# Patient Record
Sex: FEMALE | Race: BLACK OR AFRICAN AMERICAN | Employment: FULL TIME | URBAN - METROPOLITAN AREA
[De-identification: names, ages, dates, MRNs, and addresses within clinical notes are randomized per-mention and may not be internally consistent; named-entity substitution may affect disease eponyms.]

---

## 2018-01-09 ENCOUNTER — PROCEDURE VISIT (OUTPATIENT)
Dept: OBGYN CLINIC | Age: 28
End: 2018-01-09
Payer: COMMERCIAL

## 2018-01-09 VITALS
TEMPERATURE: 98.2 F | RESPIRATION RATE: 14 BRPM | WEIGHT: 245 LBS | DIASTOLIC BLOOD PRESSURE: 81 MMHG | HEART RATE: 86 BPM | SYSTOLIC BLOOD PRESSURE: 134 MMHG | OXYGEN SATURATION: 98 % | HEIGHT: 66 IN | BODY MASS INDEX: 39.37 KG/M2

## 2018-01-09 DIAGNOSIS — F17.200 SMOKER UNMOTIVATED TO QUIT: ICD-10-CM

## 2018-01-09 DIAGNOSIS — Z30.015 ENCOUNTER FOR INITIAL PRESCRIPTION OF VAGINAL RING HORMONAL CONTRACEPTIVE: ICD-10-CM

## 2018-01-09 DIAGNOSIS — Z30.46 ENCOUNTER FOR NEXPLANON REMOVAL: Primary | ICD-10-CM

## 2018-01-09 PROCEDURE — 11982 REMOVE DRUG IMPLANT DEVICE: CPT | Performed by: NURSE PRACTITIONER

## 2018-01-09 RX ORDER — ETONOGESTREL AND ETHINYL ESTRADIOL 11.7; 2.7 MG/1; MG/1
1 INSERT, EXTENDED RELEASE VAGINAL
Qty: 1 EACH | Refills: 4 | Status: SHIPPED | OUTPATIENT
Start: 2018-01-09 | End: 2020-06-08

## 2018-01-09 RX ORDER — ETONOGESTREL AND ETHINYL ESTRADIOL 11.7; 2.7 MG/1; MG/1
1 INSERT, EXTENDED RELEASE VAGINAL
Qty: 2 EACH | Refills: 0 | COMMUNITY
Start: 2018-01-09 | End: 2020-06-08

## 2018-01-16 ENCOUNTER — OFFICE VISIT (OUTPATIENT)
Dept: OBGYN CLINIC | Age: 28
End: 2018-01-16

## 2018-01-16 VITALS
HEART RATE: 72 BPM | BODY MASS INDEX: 38.45 KG/M2 | DIASTOLIC BLOOD PRESSURE: 70 MMHG | HEIGHT: 67 IN | RESPIRATION RATE: 14 BRPM | OXYGEN SATURATION: 98 % | WEIGHT: 245 LBS | SYSTOLIC BLOOD PRESSURE: 124 MMHG

## 2018-01-16 DIAGNOSIS — Z51.89 VISIT FOR WOUND CHECK: Primary | ICD-10-CM

## 2018-01-16 PROCEDURE — 99024 POSTOP FOLLOW-UP VISIT: CPT | Performed by: NURSE PRACTITIONER

## 2020-06-08 ENCOUNTER — INITIAL PRENATAL (OUTPATIENT)
Dept: OBGYN CLINIC | Age: 30
End: 2020-06-08
Payer: COMMERCIAL

## 2020-06-08 ENCOUNTER — HOSPITAL ENCOUNTER (OUTPATIENT)
Age: 30
Setting detail: SPECIMEN
Discharge: HOME | End: 2020-06-08
Payer: COMMERCIAL

## 2020-06-08 VITALS
WEIGHT: 248 LBS | BODY MASS INDEX: 39.43 KG/M2 | DIASTOLIC BLOOD PRESSURE: 78 MMHG | SYSTOLIC BLOOD PRESSURE: 118 MMHG | HEART RATE: 90 BPM | TEMPERATURE: 97.7 F

## 2020-06-08 PROBLEM — Z86.19 HX OF HERPES SIMPLEX TYPE 2 INFECTION: Status: ACTIVE | Noted: 2020-06-08

## 2020-06-08 PROBLEM — R89.9 ABNORMAL LABORATORY TEST RESULT: Status: ACTIVE | Noted: 2020-06-08

## 2020-06-08 PROBLEM — O03.9 SAB (SPONTANEOUS ABORTION): Status: ACTIVE | Noted: 2020-06-08

## 2020-06-08 PROBLEM — Z86.59 HISTORY OF ANXIETY: Status: ACTIVE | Noted: 2020-06-08

## 2020-06-08 PROBLEM — Z83.3 FAMILY HISTORY OF DIABETES MELLITUS: Status: ACTIVE | Noted: 2020-06-08

## 2020-06-08 LAB
CRL: NORMAL
SAC DIAMETER: NORMAL

## 2020-06-08 PROCEDURE — 87070 CULTURE OTHR SPECIMN AEROBIC: CPT

## 2020-06-08 PROCEDURE — 87491 CHLMYD TRACH DNA AMP PROBE: CPT

## 2020-06-08 PROCEDURE — 0500F INITIAL PRENATAL CARE VISIT: CPT | Performed by: NURSE PRACTITIONER

## 2020-06-08 PROCEDURE — G0123 SCREEN CERV/VAG THIN LAYER: HCPCS

## 2020-06-08 PROCEDURE — 87591 N.GONORRHOEAE DNA AMP PROB: CPT

## 2020-06-08 PROCEDURE — 76801 OB US < 14 WKS SINGLE FETUS: CPT | Performed by: OBSTETRICS & GYNECOLOGY

## 2020-06-08 RX ORDER — VALACYCLOVIR HYDROCHLORIDE 500 MG/1
500 TABLET, FILM COATED ORAL 2 TIMES DAILY
Qty: 60 TABLET | Refills: 1 | Status: SHIPPED | OUTPATIENT
Start: 2020-06-08 | End: 2020-07-08

## 2020-06-08 NOTE — PROGRESS NOTES
2. 10 weeks gestation of pregnancy  Christiaon Armando MD, Maternal Fetal Medicine, [place]   3. Marcia Taylor MD, Maternal Fetal Medicine, [place]   4. SAB (spontaneous ) X1 2019     5. family hx of polydactyly     6. Family history of diabetes mellitus     7. History of anxiety/ depression     8. Hx of herpes simplex type 2              Patient Active Problem List    Diagnosis Date Noted    SAB (spontaneous ) X1 2019 2020     Priority: High    family hx of polydactyly 2020     Priority: High     Patient's father and 5 siblings have extra digit on hands and feet.  Family history of diabetes mellitus 2020     Priority: High     2020 early one hour GTT ordered- patient's sister is diabetic      History of anxiety/ depression 2020     Priority: High     2020 stopped taking Prozac in January prior to becoming pregnant.  Hx of herpes simplex type 2  2020     Priority: High     2020 if no outbreaks, to start on Valtrex at 36 weeks                      Plan:      Initial labs drawn. Prenatal vitamins. RTO in 4 weeks for OB visit. Lab slips given. Unable to find FHT's  Will complete early ultrasound in office today for fetal viability. To schedule first trimester screen, MFM consult. Problem list reviewed and updated. NT Screen/Quad Testing and MSAFP Single Marker: requested  Role of ultrasound in pregnancy discussed; fetal survey: requests  Amniocentesis discussed: Declined  NIPT Testing not indicated  Cultures & Wet Prep Collected  Follow-up in 4 weeks  Repeat Annual every 1 year  Cervical Cytology Evaluation begins at 24years old. If Negative Cytology, Follow-up screening per current guidelines. MFM appointment scheduled      COUNSELING COMPLETED:  INITIAL OBSTETRICAL VISIT EVALUATION:  The patient was seen full history and physical was completed/reviewed.  Cytology was collected for patients over 21 years of and non-invasive alternative testing was reviewed. The literature regarding a questionable link to pitocin augmentation and induction of labor, the assistance of labor contractions and the initiation of contractions to help delivery, have been reviewed with the patient regarding the increased potential of having a  with Attention Deficit Hyperactivity Disorder and or Autism. These two disorders and the ramifications of their impact on a child and the family caring for that child has been reviewed with the patient in detail. She was given the risks, benefits and alternatives of the use of this medication. She has agreed to its use in the delivery of her unborn child if needed at the time of delivery, Yes. The patient was questioned in detail regarding any genetic misnomer history, chromosomal abnormalities, or learning disabilities in  herself, the father of the baby or their families. SHE DENIED ANY HISTORY AS STATED ABOVE: Yes    Upon completion of the visit all questions were answered and the patients follow-up and testing schedule were reviewed. Prenatal vitamins were given. T-dap Vaccine recommendations reviewed with the patient. Patient notified of timing of vaccination 27-36 weeks gestation. Patient aware Vaccine is NOT Live. Yes.

## 2020-06-09 LAB
C TRACH DNA GENITAL QL NAA+PROBE: NEGATIVE
N. GONORRHOEAE DNA: NEGATIVE
SPECIMEN DESCRIPTION: NORMAL

## 2020-06-10 ENCOUNTER — TELEPHONE (OUTPATIENT)
Dept: OBGYN CLINIC | Age: 30
End: 2020-06-10

## 2020-06-11 LAB
CULTURE: NORMAL
CYTOLOGY REPORT: NORMAL
Lab: NORMAL
SPECIMEN DESCRIPTION: NORMAL

## 2020-06-23 ENCOUNTER — ROUTINE PRENATAL (OUTPATIENT)
Dept: PERINATAL CARE | Age: 30
End: 2020-06-23
Payer: COMMERCIAL

## 2020-06-23 VITALS
HEIGHT: 66 IN | SYSTOLIC BLOOD PRESSURE: 115 MMHG | TEMPERATURE: 97.2 F | DIASTOLIC BLOOD PRESSURE: 71 MMHG | HEART RATE: 77 BPM | RESPIRATION RATE: 16 BRPM | BODY MASS INDEX: 39.29 KG/M2 | WEIGHT: 244.49 LBS

## 2020-06-23 PROCEDURE — 76801 OB US < 14 WKS SINGLE FETUS: CPT | Performed by: OBSTETRICS & GYNECOLOGY

## 2020-06-23 PROCEDURE — 76813 OB US NUCHAL MEAS 1 GEST: CPT | Performed by: OBSTETRICS & GYNECOLOGY

## 2020-06-24 ENCOUNTER — TELEPHONE (OUTPATIENT)
Dept: OBGYN CLINIC | Age: 30
End: 2020-06-24

## 2020-06-24 RX ORDER — ASPIRIN 81 MG/1
81 TABLET ORAL DAILY
Qty: 30 TABLET | Refills: 3 | Status: SHIPPED | OUTPATIENT
Start: 2020-06-24

## 2020-06-29 ENCOUNTER — HOSPITAL ENCOUNTER (OUTPATIENT)
Age: 30
Discharge: HOME | End: 2020-06-29
Payer: COMMERCIAL

## 2020-07-01 ENCOUNTER — HOSPITAL ENCOUNTER (OUTPATIENT)
Age: 30
Discharge: HOME | End: 2020-07-01
Payer: COMMERCIAL

## 2020-07-01 LAB
ABO/RH: NORMAL
ABSOLUTE EOS #: 0.08 K/UL (ref 0–0.44)
ABSOLUTE IMMATURE GRANULOCYTE: 0.03 K/UL (ref 0–0.3)
ABSOLUTE LYMPH #: 2.1 K/UL (ref 1.1–3.7)
ABSOLUTE MONO #: 0.68 K/UL (ref 0.1–1.2)
ANTIBODY SCREEN: NEGATIVE
BASOPHILS # BLD: 0 % (ref 0–2)
BASOPHILS ABSOLUTE: 0.03 K/UL (ref 0–0.2)
BILIRUBIN URINE: NEGATIVE
COLOR: YELLOW
COMMENT UA: NORMAL
DIFFERENTIAL TYPE: NORMAL
EOSINOPHILS RELATIVE PERCENT: 1 % (ref 1–4)
GLUCOSE BLD-MCNC: 86 MG/DL (ref 70–99)
GLUCOSE URINE: NEGATIVE
HCG QUANTITATIVE: ABNORMAL IU/L
HCT VFR BLD CALC: 37.3 % (ref 36.3–47.1)
HEMOGLOBIN: 12.2 G/DL (ref 11.9–15.1)
HEPATITIS B SURFACE ANTIGEN: NONREACTIVE
HIV AG/AB: NONREACTIVE
IMMATURE GRANULOCYTES: 0 %
KETONES, URINE: NEGATIVE
LEUKOCYTE ESTERASE, URINE: NEGATIVE
LYMPHOCYTES # BLD: 27 % (ref 24–43)
MCH RBC QN AUTO: 28.8 PG (ref 25.2–33.5)
MCHC RBC AUTO-ENTMCNC: 32.7 G/DL (ref 28.4–34.8)
MCV RBC AUTO: 88 FL (ref 82.6–102.9)
MONOCYTES # BLD: 9 % (ref 3–12)
NITRITE, URINE: NEGATIVE
NRBC AUTOMATED: 0 PER 100 WBC
PDW BLD-RTO: 13.7 % (ref 11.8–14.4)
PH UA: 6.5 (ref 5–8)
PLATELET # BLD: 272 K/UL (ref 138–453)
PLATELET ESTIMATE: NORMAL
PMV BLD AUTO: 10.8 FL (ref 8.1–13.5)
PROTEIN UA: NEGATIVE
RBC # BLD: 4.24 M/UL (ref 3.95–5.11)
RBC # BLD: NORMAL 10*6/UL
RUBV IGG SER QL: 451.9 IU/ML
SEG NEUTROPHILS: 63 % (ref 36–65)
SEGMENTED NEUTROPHILS ABSOLUTE COUNT: 5.01 K/UL (ref 1.5–8.1)
SICKLE CELL SCREEN: NEGATIVE
SPECIFIC GRAVITY UA: 1.02 (ref 1–1.03)
T. PALLIDUM, IGG: NONREACTIVE
TSH SERPL DL<=0.05 MIU/L-ACNC: 0.6 MIU/L (ref 0.3–5)
TURBIDITY: CLEAR
URINE HGB: NEGATIVE
UROBILINOGEN, URINE: NORMAL
WBC # BLD: 7.9 K/UL (ref 3.5–11.3)
WBC # BLD: NORMAL 10*3/UL

## 2020-07-01 PROCEDURE — 86762 RUBELLA ANTIBODY: CPT

## 2020-07-01 PROCEDURE — 84443 ASSAY THYROID STIM HORMONE: CPT

## 2020-07-01 PROCEDURE — 85660 RBC SICKLE CELL TEST: CPT

## 2020-07-01 PROCEDURE — 81220 CFTR GENE COM VARIANTS: CPT

## 2020-07-01 PROCEDURE — 87389 HIV-1 AG W/HIV-1&-2 AB AG IA: CPT

## 2020-07-01 PROCEDURE — 84702 CHORIONIC GONADOTROPIN TEST: CPT

## 2020-07-01 PROCEDURE — 36415 COLL VENOUS BLD VENIPUNCTURE: CPT

## 2020-07-01 PROCEDURE — 86780 TREPONEMA PALLIDUM: CPT

## 2020-07-01 PROCEDURE — 86901 BLOOD TYPING SEROLOGIC RH(D): CPT

## 2020-07-01 PROCEDURE — 87340 HEPATITIS B SURFACE AG IA: CPT

## 2020-07-01 PROCEDURE — 81003 URINALYSIS AUTO W/O SCOPE: CPT

## 2020-07-01 PROCEDURE — 86850 RBC ANTIBODY SCREEN: CPT

## 2020-07-01 PROCEDURE — 86900 BLOOD TYPING SEROLOGIC ABO: CPT

## 2020-07-01 PROCEDURE — 82947 ASSAY GLUCOSE BLOOD QUANT: CPT

## 2020-07-01 PROCEDURE — 85025 COMPLETE CBC W/AUTO DIFF WBC: CPT

## 2020-07-04 ENCOUNTER — HOSPITAL ENCOUNTER (OUTPATIENT)
Age: 30
Discharge: HOME | End: 2020-07-04
Payer: COMMERCIAL

## 2020-07-04 LAB
GLUCOSE ADMINISTRATION: NORMAL
GLUCOSE TOLERANCE SCREEN 50G: 130 MG/DL (ref 70–135)

## 2020-07-04 PROCEDURE — 82950 GLUCOSE TEST: CPT

## 2020-07-06 ENCOUNTER — TELEPHONE (OUTPATIENT)
Dept: OBGYN CLINIC | Age: 30
End: 2020-07-06

## 2020-07-06 PROBLEM — R73.09 ABNORMAL GLUCOSE TOLERANCE TEST (GTT): Status: ACTIVE | Noted: 2020-07-06

## 2020-07-06 NOTE — TELEPHONE ENCOUNTER
Attempted to notify patient of results and plan of care. Patient did not answer, voicemail left, awaiting call back.

## 2020-07-07 ENCOUNTER — TELEPHONE (OUTPATIENT)
Dept: OBGYN CLINIC | Age: 30
End: 2020-07-07

## 2020-07-07 NOTE — TELEPHONE ENCOUNTER
Patient called office reporting small pink tinges on the toilet paper after urinating, upon two occurences. Patient states that she noticed it happened yesterday and once today, but seems to have resolved. Patient admitted to vaginal intercourse approximately two days ago. Patient states she is not having vaginal bleeding that is evident - not collecting in underwear and does not collect in the toilet upon urination. Patient states it was a slight pink tinge to the toilet paper. Patient is 15 weeks gestation. Patient advised that light spotting can occur during pregnancy after intercourse. Patient offered an appointment today for an exam, upon her request. Patient declined, further stating she was is currently at work. Patient also questioned in regards to symptoms of a UTI. Patient declined dysuria, foul smelling urine, or flank pain. Patient advised that if she notices that her symptoms worsen or change, she needs to contact office or seek hospital evaluation immediately. Patient agreeable. Patient also aware of abnormal 1 hour GTT with orders for 3 hour GTT with hemoglobin a1c. Orders placed accordingly.

## 2020-07-07 NOTE — TELEPHONE ENCOUNTER
----- Message from DIO Motta CNP sent at 7/6/2020  7:39 AM EDT -----  Abnormal 1 hour GTT  Please order 3 hour GTT and Hgb A1c

## 2020-07-08 ENCOUNTER — HOSPITAL ENCOUNTER (OUTPATIENT)
Age: 30
Setting detail: SPECIMEN
Discharge: HOME | End: 2020-07-08
Payer: COMMERCIAL

## 2020-07-08 ENCOUNTER — ROUTINE PRENATAL (OUTPATIENT)
Dept: OBGYN CLINIC | Age: 30
End: 2020-07-08

## 2020-07-08 ENCOUNTER — HOSPITAL ENCOUNTER (OUTPATIENT)
Age: 30
Discharge: HOME | End: 2020-07-08
Payer: COMMERCIAL

## 2020-07-08 ENCOUNTER — OFFICE VISIT (OUTPATIENT)
Dept: OBGYN CLINIC | Age: 30
End: 2020-07-08
Payer: COMMERCIAL

## 2020-07-08 VITALS
WEIGHT: 246 LBS | HEART RATE: 97 BPM | DIASTOLIC BLOOD PRESSURE: 78 MMHG | SYSTOLIC BLOOD PRESSURE: 134 MMHG | BODY MASS INDEX: 39.71 KG/M2 | TEMPERATURE: 97.3 F

## 2020-07-08 LAB
ABDOMINAL CIRCUMFERENCE: NORMAL
BIPARIETAL DIAMETER: NORMAL
CYSTIC FIBROSIS: NORMAL
DIRECT EXAM: NORMAL
ESTIMATED FETAL WEIGHT: NORMAL
FEMORAL DIAMETER: NORMAL
FEMORAL LENGTH: NORMAL
HC/AC: NORMAL
HEAD CIRCUMFERENCE: NORMAL
Lab: NORMAL
SPECIMEN DESCRIPTION: NORMAL

## 2020-07-08 PROCEDURE — 87480 CANDIDA DNA DIR PROBE: CPT

## 2020-07-08 PROCEDURE — 87591 N.GONORRHOEAE DNA AMP PROB: CPT

## 2020-07-08 PROCEDURE — 82105 ALPHA-FETOPROTEIN SERUM: CPT

## 2020-07-08 PROCEDURE — 87660 TRICHOMONAS VAGIN DIR PROBE: CPT

## 2020-07-08 PROCEDURE — 87510 GARDNER VAG DNA DIR PROBE: CPT

## 2020-07-08 PROCEDURE — 76815 OB US LIMITED FETUS(S): CPT | Performed by: OBSTETRICS & GYNECOLOGY

## 2020-07-08 PROCEDURE — 87491 CHLMYD TRACH DNA AMP PROBE: CPT

## 2020-07-08 PROCEDURE — 36415 COLL VENOUS BLD VENIPUNCTURE: CPT

## 2020-07-08 PROCEDURE — 0502F SUBSEQUENT PRENATAL CARE: CPT | Performed by: NURSE PRACTITIONER

## 2020-07-08 NOTE — PROGRESS NOTES
Annelise Silva is a 34 y.o. female 15w1d        OB History    Para Term  AB Living   2       1     SAB TAB Ectopic Molar Multiple Live Births   1                # Outcome Date GA Lbr Don/2nd Weight Sex Delivery Anes PTL Lv   2 Current            1 SAB 2019                     Vitals  BP: 134/78  Weight: 246 lb (111.6 kg)  Pulse: 97  Patient Position: Sitting  Albumin: Negative  Glucose: Negative      The patient was seen and evaluated. There was Positive fetal movements. No contractions or leakage of fluid. Signs and symptoms of  labor as well as labor were reviewed. The Nuchal Translucency testing was reviewed and found to be normal. A single marker MSAFP was ordered for a 15-20 week gestational age window. TOP ST OH Reviewed. Dates were reviewed with the patient. An 18-22 week anatomy ultrasound has been ordered. The patient will return to the office for her next visit in 4 weeks. See antepartum flow sheet. Patient complaining of vaginal bleeding/ brown discharge for past 2 days. Had intercourse several nights ago and started having light pink blood on tissue when she wiped the next day. Since this time has been having light amount of brown/black discharge on pad. Reports + fetal movements. 2020 daily baby ASA 81mg PO for prevention of preeclampsia in pregnant women at high risk. Assessment:  1. Annelise Silva is a 34 y.o. female  2.   3. 15w1d    Patient Active Problem List    Diagnosis Date Noted    Abnormal glucose tolerance test (GTT) 2020     Priority: High     2020 abnormal 1 hour GTT-3 hour GTT and Hgb A1c ordered      SAB (spontaneous ) X1 2019 2020     Priority: High    family hx of polydactyly 2020     Priority: High     Patient's father and 5 siblings have extra digit on hands and feet.       Family history of diabetes mellitus 2020     Priority: High     2020 early one hour GTT ordered- patient's

## 2020-07-11 ENCOUNTER — HOSPITAL ENCOUNTER (OUTPATIENT)
Age: 30
Discharge: HOME | End: 2020-07-11
Payer: COMMERCIAL

## 2020-07-11 LAB
AFP INTERPRETATION: NORMAL
AFP MOM: 1.03
AFP SPECIMEN: NORMAL
AFP: 23 NG/ML
AMOUNT GLUCOSE GIVEN: NORMAL G
DATE OF BIRTH: NORMAL
DATING METHOD: NORMAL
DETERMINED BY: NORMAL
DIABETIC: NEGATIVE
DUE DATE: NORMAL
ESTIMATED DUE DATE: NORMAL
FAMILY HISTORY NTD: NEGATIVE
GESTATIONAL AGE: NORMAL
GLUCOSE FASTING: 92 MG/DL (ref 65–99)
GLUCOSE TOLERANCE TEST 1 HOUR: 117 MG/DL (ref 65–184)
GLUCOSE TOLERANCE TEST 2 HOUR: 127 MG/DL (ref 65–139)
GLUCOSE TOLERANCE TEST 3 HOUR: 82 MG/DL (ref 65–130)
INSULIN REQ DIABETES: NO
LAST MENSTRUAL PERIOD: NORMAL
MATERNAL AGE AT EDD: 28 YR
MATERNAL WEIGHT: 246
MONOCHORIONIC TWINS: NORMAL
NUMBER OF FETUSES: NORMAL
PATIENT WEIGHT UNITS: NORMAL
PATIENT WEIGHT: NORMAL
RACE (MATERNAL): NORMAL
RACE: NORMAL
REPEAT SPECIMEN?: NEGATIVE
SMOKING: NORMAL
SMOKING: NORMAL
VALPROIC/CARBAMAZEP: NORMAL
ZZ NTE CLEAN UP: HISTORY: NO

## 2020-07-11 PROCEDURE — 82951 GLUCOSE TOLERANCE TEST (GTT): CPT

## 2020-07-11 PROCEDURE — 82952 GTT-ADDED SAMPLES: CPT

## 2020-07-11 PROCEDURE — 36415 COLL VENOUS BLD VENIPUNCTURE: CPT

## 2020-07-27 ENCOUNTER — ROUTINE PRENATAL (OUTPATIENT)
Dept: OBGYN CLINIC | Age: 30
End: 2020-07-27

## 2020-07-27 VITALS
BODY MASS INDEX: 39.54 KG/M2 | HEART RATE: 111 BPM | DIASTOLIC BLOOD PRESSURE: 60 MMHG | SYSTOLIC BLOOD PRESSURE: 126 MMHG | WEIGHT: 245 LBS | TEMPERATURE: 97.1 F

## 2020-07-27 PROCEDURE — 0502F SUBSEQUENT PRENATAL CARE: CPT | Performed by: NURSE PRACTITIONER

## 2020-07-27 NOTE — PROGRESS NOTES
Kailey Maria is a 34 y.o. female 17w6d        OB History    Para Term  AB Living   2       1     SAB TAB Ectopic Molar Multiple Live Births   1                # Outcome Date GA Lbr Don/2nd Weight Sex Delivery Anes PTL Lv   2 Current            1 SAB 2019                     Vitals  BP: 126/60  Weight: 245 lb (111.1 kg)  Pulse: 111  Patient Position: Sitting  Albumin: Trace  Glucose: Negative      The patient was seen and evaluated. There was Positive fetal movements. No contractions or leakage of fluid. Signs and symptoms of  labor as well as labor were reviewed. The Nuchal Translucency testing was reviewed and found to be normal. A single marker MSAFP was ordered for a 15-20 week gestational age window. TOP ST OH Reviewed. Dates were reviewed with the patient. An 18-22 week anatomy ultrasound has been ordered. The patient will return to the office for her next visit in 4 weeks. See antepartum flow sheet. 2020 daily baby ASA 81mg PO for prevention of preeclampsia in pregnant women at high risk. Assessment:  1. Kailey Maria is a 34 y.o. female  2.   3. 17w6d    Patient Active Problem List    Diagnosis Date Noted    Abnormal glucose tolerance test (GTT) 2020     Priority: High     2020 abnormal 1 hour GTT-3 hour GTT and Hgb A1c ordered  2020 3 hour GTT WNL      SAB (spontaneous ) X1 2019 2020     Priority: High    family hx of polydactyly 2020     Priority: High     Patient's father and 5 siblings have extra digit on hands and feet.  Family history of diabetes mellitus 2020     Priority: High     2020 early one hour GTT ordered- patient's sister is diabetic      History of anxiety/ depression 2020     Priority: High     2020 stopped taking Prozac in January prior to becoming pregnant.       Hx of herpes simplex type 2  2020     Priority: High     2020 if no outbreaks, to start on Valtrex at 36 weeks          Diagnosis Orders   1. 17 weeks gestation of pregnancy     2. SAB (spontaneous )     3. Abnormal glucose tolerance test (GTT)     4. Abnormal laboratory test result     5. Family history of diabetes mellitus     6. History of anxiety     7. Hx of herpes simplex type 2 infection           Plan:  RTO in 4 weeks. No longer having vaginal bleeding. [facility] anatomy scan 2020.

## 2020-08-18 ENCOUNTER — ROUTINE PRENATAL (OUTPATIENT)
Dept: PERINATAL CARE | Age: 30
End: 2020-08-18
Payer: COMMERCIAL

## 2020-08-18 VITALS
RESPIRATION RATE: 18 BRPM | WEIGHT: 245 LBS | SYSTOLIC BLOOD PRESSURE: 119 MMHG | HEIGHT: 66 IN | HEART RATE: 114 BPM | TEMPERATURE: 97.2 F | BODY MASS INDEX: 39.37 KG/M2 | DIASTOLIC BLOOD PRESSURE: 66 MMHG

## 2020-08-18 PROCEDURE — 99243 OFF/OP CNSLTJ NEW/EST LOW 30: CPT | Performed by: OBSTETRICS & GYNECOLOGY

## 2020-08-18 PROCEDURE — 76817 TRANSVAGINAL US OBSTETRIC: CPT | Performed by: OBSTETRICS & GYNECOLOGY

## 2020-08-18 PROCEDURE — 76811 OB US DETAILED SNGL FETUS: CPT | Performed by: OBSTETRICS & GYNECOLOGY

## 2020-08-25 ENCOUNTER — ROUTINE PRENATAL (OUTPATIENT)
Dept: OBGYN CLINIC | Age: 30
End: 2020-08-25

## 2020-08-25 VITALS
DIASTOLIC BLOOD PRESSURE: 76 MMHG | SYSTOLIC BLOOD PRESSURE: 118 MMHG | BODY MASS INDEX: 39.54 KG/M2 | WEIGHT: 245 LBS | HEART RATE: 100 BPM

## 2020-08-25 PROCEDURE — 0502F SUBSEQUENT PRENATAL CARE: CPT | Performed by: OBSTETRICS & GYNECOLOGY

## 2020-08-25 NOTE — PROGRESS NOTES
Annelise Silva is a 34 y.o. female 24w0d        OB History    Para Term  AB Living   2       1     SAB TAB Ectopic Molar Multiple Live Births   1                # Outcome Date GA Lbr Don/2nd Weight Sex Delivery Anes PTL Lv   2 Current            1 2019               Vitals  BP: 118/76  Weight: 245 lb (111.1 kg)  Pulse: 100  Patient Position: Sitting  Albumin: Negative  Glucose: Negative    The patient was seen and evaluated. There was positive fetal movements. No contractions or leakage of fluid. Signs and symptoms of  labor as well as labor were reviewed. The Nuchal Translucency testing was reviewed and found to be normal A single marker MSAFP was reviewed and found to be normal. The patients anatomy ultrasound has been completed and reviewed with patient. TOP ST OH Reviewed. A 28 week lab panel was ordered. This includes a (HH, 1 hr GTT, U/A C&S). The patient is to complete this in the next two to four weeks. The S/S of Pre-Eclampsia were reviewed with the patient in detail. She is to report any of these if they occur. She currently denies any of these. The patient is RH positive Rhogam Ordered no    The patient was instructed on fetal kick counts and was given a kick sheet to complete every 8 hours. This is to begin at 28 weeks gestation. She was instructed that the baby should move at a minimum of ten times within one hour after a meal. The patient was instructed to lay down on her left side twenty minutes after eating and count movements for up to one hour with a target value of ten movements. She was instructed to notify the office if she did not make that target after two attempts or if after any attempt there was less than four movements. 2020 daily baby ASA 81mg PO for prevention of preeclampsia in pregnant women at high risk. Assessment:  1. Annelise Silva is a 34 y.o. female  2.    3. 22w0d    Patient Active Problem List    Diagnosis Date mortality, and cessation programs were reviewed. The risks include but are not limited to increased risks of  labor,  delivery, premature rupture of membranes, intrauterine growth restriction, intrauterine fetal demise and abruptio placenta. Secondary smoke risks were also reviewed. Increases in cancer, respiratory problems, and sudden infant death syndrome were reviewed as well. The patient was informed of a 2-4% risk of congenital anomalies in the general population. She was also informed that karyotyping is the only way to evaluate the fetus for genetic problems and genetic lethal anomalies. Chorionic villous sampling, amniocentesis and Maternal Genetic Blood Sampling-(NIPT Testing) were also discussed with morbidity rates in detail. She declined any of the options. Route of delivery and counseling on vaginal, operative vaginal, and  sections were completed with the risks of each to both the patient as well as her baby. The possibility of a blood transfusion was discussed as well. The patient was not opposed to receiving a transfusion if needed. Nuchal translucency and MSAFP single marker testing was reviewed in detail with attention to timing of testing and their windows. For patients beyond the gestational age for Nuchal translucency evaluation, ([id number]) Quad testing was recommended. Timing for the Quad test was reviewed. Benefits of the above testing was reviewed. A second trimester amniocentesis was also made available to the patient. Risks, Benefits and non-invasive alternative testing was reviewed. The patients diagnosed with Advanced maternal age, AMA (age of 29 yo or beyond at delivery), had NIPT recommended. This was discussed in lay terms with Risks and Benefits reviewed.     The literature regarding a questionable link to pitocin augmentation and induction of labor, the assistance of labor contractions and the initiation of contractions to help delivery, have been reviewed with the patient regarding the increased potential of having a  with Attention Deficit Hyperactivity Disorder and or Autism. These two disorders and the ramifications of their impact on a child and the family caring for that child has been reviewed with the patient in detail. She was given the risks, benefits and alternatives of the use of this medication. She has agreed to its use in the delivery of her unborn child if needed at the time of delivery, Yes. The patient was questioned in detail regarding any genetic misnomer history, chromosomal abnormalities, or learning disabilities in  herself, the father of the baby or their families. SHE DENIED ANY HISTORY AS STATED ABOVE: Yes    Upon completion of the visit all questions were answered and the patients follow-up and testing schedule were reviewed. Prenatal vitamins were given.

## 2020-09-21 ENCOUNTER — ROUTINE PRENATAL (OUTPATIENT)
Dept: OBGYN CLINIC | Age: 30
End: 2020-09-21

## 2020-09-21 VITALS
TEMPERATURE: 97.5 F | WEIGHT: 245 LBS | HEART RATE: 100 BPM | BODY MASS INDEX: 39.54 KG/M2 | SYSTOLIC BLOOD PRESSURE: 118 MMHG | DIASTOLIC BLOOD PRESSURE: 62 MMHG

## 2020-09-21 PROCEDURE — 0502F SUBSEQUENT PRENATAL CARE: CPT | Performed by: NURSE PRACTITIONER

## 2020-09-21 RX ORDER — VALACYCLOVIR HYDROCHLORIDE 500 MG/1
500 TABLET, FILM COATED ORAL 2 TIMES DAILY
Qty: 20 TABLET | Refills: 0 | Status: SHIPPED | OUTPATIENT
Start: 2020-09-21 | End: 2020-10-01

## 2020-09-21 NOTE — PROGRESS NOTES
Penny Avila is a 34 y.o. female 23w6d    Patient states  Acyclovir gave her a HSV outbreak. States she started taking acyclovir and had an outbreak and continued taking Rx for 2 weeks. Once she stopped taking acyclovir her outbreak cleared up. Desires to try valtrex to see if it causes her to get an outbreak.     OB History    Para Term  AB Living   2       1     SAB TAB Ectopic Molar Multiple Live Births   1                # Outcome Date GA Lbr Don/2nd Weight Sex Delivery Anes PTL Lv   2 Current            1 2019               Vitals  BP: 118/62  Weight: 245 lb (111.1 kg)  Pulse: 100  Patient Position: Sitting  Albumin: Trace  Glucose: Negative    The patient was seen and evaluated. There was positive fetal movements. No contractions or leakage of fluid. Signs and symptoms of  labor as well as labor were reviewed. The Nuchal Translucency testing was reviewed and found to be normal A single marker MSAFP was reviewed and found to be normal. The patients anatomy ultrasound has been completed and reviewed with patient. TOP ST OH Reviewed. A 28 week lab panel was ordered. This includes a (HH, 1 hr GTT, U/A C&S). The patient is to complete this in the next two to four weeks. The S/S of Pre-Eclampsia were reviewed with the patient in detail. She is to report any of these if they occur. She currently denies any of these. The patient is RH positive Rhogam Ordered no    The patient was instructed on fetal kick counts and was given a kick sheet to complete every 8 hours. This is to begin at 28 weeks gestation. She was instructed that the baby should move at a minimum of ten times within one hour after a meal. The patient was instructed to lay down on her left side twenty minutes after eating and count movements for up to one hour with a target value of ten movements.   She was instructed to notify the office if she did not make that target after two attempts or if after any attempt there was less than four movements. 2020 daily baby ASA 81mg PO for prevention of preeclampsia in pregnant women at high risk. Assessment:  1. Willis Rowe is a 34 y.o. female  2.   3. 25w6d    Patient Active Problem List    Diagnosis Date Noted    SAB (spontaneous ) X1 2020     Priority: High    Abnormal glucose tolerance test (GTT) 2020 abnormal 1 hour GTT-3 hour GTT and Hgb A1c ordered  2020 3 hour GTT WNL      family hx of polydactyly 2020     Patient's father and 5 siblings have extra digit on hands and feet.  Family history of diabetes mellitus 2020 early one hour GTT ordered- patient's sister is diabetic      History of anxiety/ depression 2020 stopped taking Prozac in January prior to becoming pregnant.  Hx of herpes simplex type 2  2020 if no outbreaks, to start on Valtrex at 36 weeks          Diagnosis Orders   1. HRP (high risk pregnancy), second trimester  CBC Auto Differential    Urinalysis Reflex to Culture    Glucose Tolerance 3 hours (Gestational)   2. 25 weeks gestation of pregnancy  CBC Auto Differential    Urinalysis Reflex to Culture    Glucose Tolerance 3 hours (Gestational)         Plan:  The patient will return to the office for her next visit in 3 weeks. See antepartum flow sheet. Lab slip given   Rx valtrex        Patient was seen with total face to face time of 15 minutes. More than 50% of this visit was on counseling and education regarding her    Diagnosis Orders   1. HRP (high risk pregnancy), second trimester  CBC Auto Differential    Urinalysis Reflex to Culture    Glucose Tolerance 3 hours (Gestational)   2. 25 weeks gestation of pregnancy  CBC Auto Differential    Urinalysis Reflex to Culture    Glucose Tolerance 3 hours (Gestational)    and her options.  She was also counseled on her preventative health maintenance recommendations and follow-up.

## 2020-10-20 ENCOUNTER — ROUTINE PRENATAL (OUTPATIENT)
Dept: PERINATAL CARE | Age: 30
End: 2020-10-20
Payer: MEDICAID

## 2020-10-20 VITALS
WEIGHT: 249 LBS | RESPIRATION RATE: 16 BRPM | HEIGHT: 66 IN | BODY MASS INDEX: 40.02 KG/M2 | HEART RATE: 100 BPM | TEMPERATURE: 97 F | SYSTOLIC BLOOD PRESSURE: 120 MMHG | DIASTOLIC BLOOD PRESSURE: 78 MMHG

## 2020-10-20 PROCEDURE — 76805 OB US >/= 14 WKS SNGL FETUS: CPT | Performed by: OBSTETRICS & GYNECOLOGY

## 2020-10-20 PROCEDURE — 76819 FETAL BIOPHYS PROFIL W/O NST: CPT | Performed by: OBSTETRICS & GYNECOLOGY

## 2020-10-30 ENCOUNTER — TELEPHONE (OUTPATIENT)
Dept: OBGYN CLINIC | Age: 30
End: 2020-10-30

## 2020-12-01 ENCOUNTER — ROUTINE PRENATAL (OUTPATIENT)
Dept: PERINATAL CARE | Age: 30
End: 2020-12-01
Payer: MEDICAID

## 2020-12-01 VITALS
DIASTOLIC BLOOD PRESSURE: 74 MMHG | HEART RATE: 88 BPM | WEIGHT: 253 LBS | BODY MASS INDEX: 40.66 KG/M2 | SYSTOLIC BLOOD PRESSURE: 122 MMHG | TEMPERATURE: 97.1 F | RESPIRATION RATE: 16 BRPM | HEIGHT: 66 IN

## 2020-12-01 PROCEDURE — 76816 OB US FOLLOW-UP PER FETUS: CPT | Performed by: OBSTETRICS & GYNECOLOGY

## 2020-12-01 PROCEDURE — 76819 FETAL BIOPHYS PROFIL W/O NST: CPT | Performed by: OBSTETRICS & GYNECOLOGY

## 2020-12-01 RX ORDER — VALACYCLOVIR HYDROCHLORIDE 500 MG/1
TABLET, FILM COATED ORAL
COMMUNITY
Start: 2020-10-26

## 2024-10-13 ENCOUNTER — HOSPITAL ENCOUNTER (EMERGENCY)
Age: 34
Discharge: HOME | End: 2024-10-14
Attending: EMERGENCY MEDICINE
Payer: MEDICAID

## 2024-10-13 ENCOUNTER — APPOINTMENT (OUTPATIENT)
Dept: GENERAL RADIOLOGY | Age: 34
End: 2024-10-13
Payer: MEDICAID

## 2024-10-13 VITALS
OXYGEN SATURATION: 99 % | DIASTOLIC BLOOD PRESSURE: 90 MMHG | HEART RATE: 90 BPM | TEMPERATURE: 97.5 F | RESPIRATION RATE: 16 BRPM | SYSTOLIC BLOOD PRESSURE: 133 MMHG

## 2024-10-13 DIAGNOSIS — M79.601 RIGHT ARM PAIN: Primary | ICD-10-CM

## 2024-10-13 PROCEDURE — 99283 EMERGENCY DEPT VISIT LOW MDM: CPT | Performed by: EMERGENCY MEDICINE

## 2024-10-13 PROCEDURE — 73080 X-RAY EXAM OF ELBOW: CPT

## 2024-10-13 ASSESSMENT — PAIN - FUNCTIONAL ASSESSMENT: PAIN_FUNCTIONAL_ASSESSMENT: 0-10

## 2024-10-13 ASSESSMENT — PAIN SCALES - GENERAL: PAINLEVEL_OUTOF10: 5

## 2024-10-14 NOTE — DISCHARGE INSTRUCTIONS
You have been evaluated in the Emergency Department at [facility] 10/13/2024     Your recommendations and if necessary prescriptions from today's visit:   -Follow-up with orthopedics  -Take medication as prescribed  -Follow-up with your PCP    If you do not have a primary care provider, establish care with a provider that you can begin to regularly follow-up with.    Should you have any questions regarding your care or further treatment, please call [facility] Emergency Department at [phone].    PLEASE RETURN TO THE EMERGENCY DEPARTMENT IMMEDIATELY for   -Numbness, tingling, weakness, decreased  strength    OR    -Changing symptoms  -Worsening symptoms  -Unable to follow up with your primary care provider  -Any other care or concern

## 2024-10-14 NOTE — ED PROVIDER NOTES
[facility] ED  Emergency Department Encounter  Emergency Medicine Resident     Pt Name:Janae Brooks  MRN: [id number]  Birthdate 1990  Date of evaluation: 10/13/24  PCP:  No primary care provider on file.  Note Started: 10:58 PM EDT      CHIEF COMPLAINT       Chief Complaint   Patient presents with    Arm Pain     Right arm       HISTORY OF PRESENT ILLNESS  (Location/Symptom, Timing/Onset, Context/Setting, Quality, Duration, Modifying Factors, Severity.)      Janae Brooks is a 33 y.o. female presenting to ED via for    CC's: Right arm/elbow pain    Patient endorses that approximately 24 hours ago she was \"roughhousing \"when she was reaching down to slap something and caught her arm and noticed a pop.  Patient endorses right distal upper extremity pain.  Patient is right-handed.  Patient denies any previous or thesis in this right hand.  Patient denies any numbness, tingling, weakness.  Patient neurovasc intact.  Patient endorsing pain just distal to the flexor portion of the right elbow.  Patient endorses that she just lost her mom.  Patient denies any falls, any other acute complaints currently.    Notable PMHx: Anxiety, depression    PAST MEDICAL / SURGICAL / SOCIAL / FAMILY HISTORY      has a past medical history of Anxiety, Chronic GERD, Depression, Ovarian cyst, and PTSD (post-traumatic stress disorder).       has no past surgical history on file.      Social History     Socioeconomic History    Marital status: Single     Spouse name: Not on file    Number of children: Not on file    Years of education: Not on file    Highest education level: Not on file   Occupational History    Not on file   Tobacco Use    Smoking status: Former     Current packs/day: 0.25     Average packs/day: 0.3 packs/day for 12.0 years (3.0 ttl pk-yrs)     Types: Cigarettes    Smokeless tobacco: Never   Vaping Use    Vaping status: Never Used   Substance and Sexual Activity    Alcohol use: Not Currently    Drug

## 2024-10-14 NOTE — ED PROVIDER NOTES
[facility]     Emergency Department     Faculty Attestation    I performed a history and physical examination of the patient and discussed management with the resident. I reviewed the resident’s note and agree with the documented findings and plan of care. Any areas of disagreement are noted on the chart. I was personally present for the key portions of any procedures. I have documented in the chart those procedures where I was not present during the key portions. I have reviewed the emergency nurses triage note. I agree with the chief complaint, past medical history, past surgical history, allergies, medications, social and family history as documented unless otherwise noted below. Documentation of the HPI, Physical Exam and Medical Decision Making performed by medical students or scribes is based on my personal performance of the HPI, PE and MDM. For Physician Assistant/ Nurse Practitioner cases/documentation I have personally evaluated this patient and have completed at least one if not all key elements of the E/M (history, physical exam, and MDM). Additional findings are as noted.    Vital signs:   Vitals:    10/13/24 2241   BP: (!) 133/90   Pulse: 90   Resp: 16   Temp: 97.5 °F (36.4 °C)   SpO2: 99%      Right forearm pain, worse with supination. Plan for x-ray.             Kenyetta Rich M.D,  Attending Emergency  Physician           Kenyetta Rich MD  10/13/24 8045

## 2024-10-14 NOTE — ED NOTES
Pt to ED 34 via triage c/o right forearm pain with rotation. Pt states that she was rough housing with her brother last night, she states they were shadow boxing and when he blocked a hit she felt pins and needles shoot up her arm. Pt states that afterwards she had pain while rotating it. Pt states she is still able to feel sensation in her fingers and denies any current pins and needles. Pt states that pain goes away while she is able to support it but when she leaves it alone or rotates it, it comes back. Pt RR is even and non-labored on arrival, pt vitals complete, resident is at the bedside to assess, plan of care ongoing.

## 2024-10-17 ENCOUNTER — TELEPHONE (OUTPATIENT)
Dept: ORTHOPEDIC SURGERY | Age: 34
End: 2024-10-17

## 2024-10-17 ENCOUNTER — HOSPITAL ENCOUNTER (OUTPATIENT)
Dept: MRI IMAGING | Facility: CLINIC | Age: 34
Discharge: HOME | End: 2024-10-19
Payer: MEDICAID

## 2024-10-17 ENCOUNTER — OFFICE VISIT (OUTPATIENT)
Dept: ORTHOPEDIC SURGERY | Age: 34
End: 2024-10-17
Payer: MEDICAID

## 2024-10-17 VITALS — BODY MASS INDEX: 41.62 KG/M2 | HEIGHT: 66 IN | WEIGHT: 259 LBS

## 2024-10-17 DIAGNOSIS — M25.521 RIGHT ELBOW PAIN: ICD-10-CM

## 2024-10-17 DIAGNOSIS — M25.521 RIGHT ELBOW PAIN: Primary | ICD-10-CM

## 2024-10-17 PROCEDURE — 99203 OFFICE O/P NEW LOW 30 MIN: CPT

## 2024-10-17 PROCEDURE — 73221 MRI JOINT UPR EXTREM W/O DYE: CPT

## 2024-10-17 ASSESSMENT — ENCOUNTER SYMPTOMS: COLOR CHANGE: 0
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
## 2024-10-17 NOTE — TELEPHONE ENCOUNTER
I called the patient and informed her of the MRI results. The patient would like to come in tomorrow at 1:00 for surgery discussion. Please add her to my schedule for 1:00.    Thank you.

## 2024-10-18 ENCOUNTER — OFFICE VISIT (OUTPATIENT)
Dept: ORTHOPEDIC SURGERY | Age: 34
End: 2024-10-18
Payer: MEDICAID

## 2024-10-18 ENCOUNTER — TELEPHONE (OUTPATIENT)
Dept: ORTHOPEDIC SURGERY | Age: 34
End: 2024-10-18

## 2024-10-18 VITALS — BODY MASS INDEX: 41.82 KG/M2 | HEIGHT: 66 IN

## 2024-10-18 DIAGNOSIS — S46.211D BICEPS RUPTURE, DISTAL, RIGHT, SUBSEQUENT ENCOUNTER: ICD-10-CM

## 2024-10-18 DIAGNOSIS — M25.521 RIGHT ELBOW PAIN: Primary | ICD-10-CM

## 2024-10-18 PROCEDURE — 99213 OFFICE O/P EST LOW 20 MIN: CPT

## 2024-10-18 ASSESSMENT — ENCOUNTER SYMPTOMS: COLOR CHANGE: 0

## 2024-10-18 NOTE — TELEPHONE ENCOUNTER
125pm Called and spoke with patient regarding her appointment today with Neena, she stated that she forgot her appointment today. Asked her if she could still make appointment today Neena needed to see her regarding setting up surgery. She stated that her she was on her way and that her GPS said it would be about 14 minutes.

## 2024-10-18 NOTE — PROGRESS NOTES
perioral lesions  Pulm: Respirations unlabored and regular. Symmetric chest excursion without outward deformity is noted.   Skin: warm, well perfused  Psych:   Patient has good fund of knowledge and displays understanging of exam, diagnosis, and plan.    Radiology:     Narrative & Impression  EXAMINATION:  MRI OF THE RIGHT ELBOW WITHOUT CONTRAST, 10/17/2024 1:11 pm     TECHNIQUE:  Multiplanar multisequence MRI of the right elbow was performed without the  administration of intravenous contrast.     COMPARISON:  None.     HISTORY:  ORDERING SYSTEM PROVIDED HISTORY: Right elbow pain  TECHNOLOGIST PROVIDED HISTORY:  suspect partial rupture of distal biceps tendon  Reason for Exam: pt stated injurt right elbow pain unable to lift     FINDINGS:  MEDIAL EPICONDYLE: Normal without bone marrow edema.     LATERAL EPICONDYLE: Normal without bone marrow edema.     MEDIAL COLLATERAL LIGAMENT: Intact ulnar collateral ligament.     LATERAL COLLATERAL LIGAMENT: Intact radial collateral ligament and lateral  ulnar collateral ligament.     MUSCLES / TENDONS: Biceps tendon is diffusely abnormal in appearance.  Large  amount of fluid extends along the entire length of the biceps tendon.  It is  not detached from the radial tubercle but the distal portion is markedly  thickened and irregular in appearance.  This is likely functionally torn.  The tendon does not appear to be retracted.     Brachialis tendon has a tiny amount of fluid along the attachment point but  is grossly intact.     Common flexor tendon origin is intact.     Common extensor tendon origin is intact.     ULNAR NERVE: Normal MRI appearance of the ulnar nerve and cubital tunnel.     JOINT SPACES: Small joint effusion.  No significant degenerative disease.  Normal alignment.     BONE MARROW: No evidence of fracture. Normal marrow signal.     SOFT TISSUES: No significant soft tissue edema, fluid collections, or  appreciable masses.     IMPRESSION:  High-grade partial

## 2024-10-21 ENCOUNTER — PREP FOR PROCEDURE (OUTPATIENT)
Dept: ORTHOPEDIC SURGERY | Age: 34
End: 2024-10-21

## 2024-10-21 DIAGNOSIS — M25.521 RIGHT ELBOW PAIN: ICD-10-CM

## 2024-10-21 DIAGNOSIS — S46.211D RUPTURE OF PROXIMAL BICEPS TENDON, RIGHT, SUBSEQUENT ENCOUNTER: ICD-10-CM

## 2024-10-22 ENCOUNTER — ANESTHESIA (OUTPATIENT)
Dept: OPERATING ROOM | Age: 34
End: 2024-10-22
Payer: MEDICAID

## 2024-10-22 ENCOUNTER — APPOINTMENT (OUTPATIENT)
Dept: GENERAL RADIOLOGY | Age: 34
End: 2024-10-22
Attending: ORTHOPAEDIC SURGERY
Payer: MEDICAID

## 2024-10-22 ENCOUNTER — HOSPITAL ENCOUNTER (OUTPATIENT)
Age: 34
Setting detail: OUTPATIENT SURGERY
Discharge: HOME | End: 2024-10-22
Attending: ORTHOPAEDIC SURGERY | Admitting: ORTHOPAEDIC SURGERY
Payer: MEDICAID

## 2024-10-22 ENCOUNTER — ANESTHESIA EVENT (OUTPATIENT)
Dept: OPERATING ROOM | Age: 34
End: 2024-10-22
Payer: MEDICAID

## 2024-10-22 VITALS
HEART RATE: 88 BPM | SYSTOLIC BLOOD PRESSURE: 126 MMHG | RESPIRATION RATE: 16 BRPM | BODY MASS INDEX: 40.81 KG/M2 | WEIGHT: 260 LBS | OXYGEN SATURATION: 97 % | TEMPERATURE: 97.7 F | DIASTOLIC BLOOD PRESSURE: 81 MMHG | HEIGHT: 67 IN

## 2024-10-22 DIAGNOSIS — G89.18 POST-OP PAIN: Primary | ICD-10-CM

## 2024-10-22 LAB — HCG, PREGNANCY URINE (POC): NEGATIVE

## 2024-10-22 PROCEDURE — 7100000011 HC PHASE II RECOVERY - ADDTL 15 MIN: Performed by: ORTHOPAEDIC SURGERY

## 2024-10-22 PROCEDURE — 2580000003 HC RX 258: Performed by: ANESTHESIOLOGY

## 2024-10-22 PROCEDURE — 3700000000 HC ANESTHESIA ATTENDED CARE: Performed by: ORTHOPAEDIC SURGERY

## 2024-10-22 PROCEDURE — 6360000002 HC RX W HCPCS: Performed by: ANESTHESIOLOGY

## 2024-10-22 PROCEDURE — C1713 ANCHOR/SCREW BN/BN,TIS/BN: HCPCS | Performed by: ORTHOPAEDIC SURGERY

## 2024-10-22 PROCEDURE — 7100000000 HC PACU RECOVERY - FIRST 15 MIN: Performed by: ORTHOPAEDIC SURGERY

## 2024-10-22 PROCEDURE — 6360000002 HC RX W HCPCS: Performed by: NURSE ANESTHETIST, CERTIFIED REGISTERED

## 2024-10-22 PROCEDURE — 73080 X-RAY EXAM OF ELBOW: CPT

## 2024-10-22 PROCEDURE — 3700000001 HC ADD 15 MINUTES (ANESTHESIA): Performed by: ORTHOPAEDIC SURGERY

## 2024-10-22 PROCEDURE — 2709999900 HC NON-CHARGEABLE SUPPLY: Performed by: ORTHOPAEDIC SURGERY

## 2024-10-22 PROCEDURE — 3600000002 HC SURGERY LEVEL 2 BASE: Performed by: ORTHOPAEDIC SURGERY

## 2024-10-22 PROCEDURE — 6360000002 HC RX W HCPCS: Performed by: ORTHOPAEDIC SURGERY

## 2024-10-22 PROCEDURE — 3600000012 HC SURGERY LEVEL 2 ADDTL 15MIN: Performed by: ORTHOPAEDIC SURGERY

## 2024-10-22 PROCEDURE — 81025 URINE PREGNANCY TEST: CPT

## 2024-10-22 PROCEDURE — 7100000010 HC PHASE II RECOVERY - FIRST 15 MIN: Performed by: ORTHOPAEDIC SURGERY

## 2024-10-22 PROCEDURE — 2500000003 HC RX 250 WO HCPCS: Performed by: NURSE ANESTHETIST, CERTIFIED REGISTERED

## 2024-10-22 PROCEDURE — 64415 NJX AA&/STRD BRCH PLXS IMG: CPT | Performed by: ANESTHESIOLOGY

## 2024-10-22 PROCEDURE — 7100000001 HC PACU RECOVERY - ADDTL 15 MIN: Performed by: ORTHOPAEDIC SURGERY

## 2024-10-22 DEVICE — IMPL DELIVERY SYS,DISTAL BICEPS REPR
Type: IMPLANTABLE DEVICE | Site: ELBOW | Status: FUNCTIONAL
Brand: ARTHREX®

## 2024-10-22 RX ORDER — ESCITALOPRAM OXALATE 10 MG/1
10 TABLET ORAL EVERY MORNING
COMMUNITY
Start: 2024-10-08

## 2024-10-22 RX ORDER — METOCLOPRAMIDE HYDROCHLORIDE 5 MG/ML
10 INJECTION INTRAMUSCULAR; INTRAVENOUS
Status: COMPLETED | OUTPATIENT
Start: 2024-10-22 | End: 2024-10-22

## 2024-10-22 RX ORDER — DIPHENHYDRAMINE HYDROCHLORIDE 50 MG/ML
12.5 INJECTION INTRAMUSCULAR; INTRAVENOUS
Status: DISCONTINUED | OUTPATIENT
Start: 2024-10-22 | End: 2024-10-22 | Stop reason: HOSPADM

## 2024-10-22 RX ORDER — MEPERIDINE HYDROCHLORIDE 50 MG/ML
12.5 INJECTION INTRAMUSCULAR; INTRAVENOUS; SUBCUTANEOUS EVERY 5 MIN PRN
Status: DISCONTINUED | OUTPATIENT
Start: 2024-10-22 | End: 2024-10-22 | Stop reason: HOSPADM

## 2024-10-22 RX ORDER — ONDANSETRON 2 MG/ML
INJECTION INTRAMUSCULAR; INTRAVENOUS
Status: DISCONTINUED | OUTPATIENT
Start: 2024-10-22 | End: 2024-10-22 | Stop reason: SDUPTHER

## 2024-10-22 RX ORDER — FENTANYL CITRATE 50 UG/ML
25 INJECTION, SOLUTION INTRAMUSCULAR; INTRAVENOUS EVERY 5 MIN PRN
Status: DISCONTINUED | OUTPATIENT
Start: 2024-10-22 | End: 2024-10-22 | Stop reason: HOSPADM

## 2024-10-22 RX ORDER — LIDOCAINE HYDROCHLORIDE 20 MG/ML
INJECTION, SOLUTION EPIDURAL; INFILTRATION; INTRACAUDAL; PERINEURAL
Status: DISCONTINUED | OUTPATIENT
Start: 2024-10-22 | End: 2024-10-22 | Stop reason: SDUPTHER

## 2024-10-22 RX ORDER — OXYCODONE HYDROCHLORIDE 5 MG/1
5 TABLET ORAL
Status: DISCONTINUED | OUTPATIENT
Start: 2024-10-22 | End: 2024-10-22 | Stop reason: HOSPADM

## 2024-10-22 RX ORDER — FENTANYL CITRATE 50 UG/ML
INJECTION, SOLUTION INTRAMUSCULAR; INTRAVENOUS
Status: DISCONTINUED | OUTPATIENT
Start: 2024-10-22 | End: 2024-10-22 | Stop reason: SDUPTHER

## 2024-10-22 RX ORDER — HYDROMORPHONE HYDROCHLORIDE 1 MG/ML
0.5 INJECTION, SOLUTION INTRAMUSCULAR; INTRAVENOUS; SUBCUTANEOUS EVERY 5 MIN PRN
Status: DISCONTINUED | OUTPATIENT
Start: 2024-10-22 | End: 2024-10-22 | Stop reason: HOSPADM

## 2024-10-22 RX ORDER — NALOXONE HYDROCHLORIDE 0.4 MG/ML
INJECTION, SOLUTION INTRAMUSCULAR; INTRAVENOUS; SUBCUTANEOUS PRN
Status: DISCONTINUED | OUTPATIENT
Start: 2024-10-22 | End: 2024-10-22 | Stop reason: HOSPADM

## 2024-10-22 RX ORDER — FENTANYL CITRATE 50 UG/ML
100 INJECTION, SOLUTION INTRAMUSCULAR; INTRAVENOUS ONCE
Status: COMPLETED | OUTPATIENT
Start: 2024-10-22 | End: 2024-10-22

## 2024-10-22 RX ORDER — DOCUSATE SODIUM 100 MG/1
100 CAPSULE, LIQUID FILLED ORAL 2 TIMES DAILY
Qty: 60 CAPSULE | Refills: 0 | Status: SHIPPED | OUTPATIENT
Start: 2024-10-22 | End: 2024-11-21

## 2024-10-22 RX ORDER — CETIRIZINE HYDROCHLORIDE 10 MG/1
10 TABLET ORAL DAILY
COMMUNITY

## 2024-10-22 RX ORDER — PROCHLORPERAZINE EDISYLATE 5 MG/ML
10 INJECTION INTRAMUSCULAR; INTRAVENOUS
Status: COMPLETED | OUTPATIENT
Start: 2024-10-22 | End: 2024-10-22

## 2024-10-22 RX ORDER — SODIUM CHLORIDE 0.9 % (FLUSH) 0.9 %
5-40 SYRINGE (ML) INJECTION EVERY 12 HOURS SCHEDULED
Status: DISCONTINUED | OUTPATIENT
Start: 2024-10-22 | End: 2024-10-22 | Stop reason: HOSPADM

## 2024-10-22 RX ORDER — ROCURONIUM BROMIDE 10 MG/ML
INJECTION, SOLUTION INTRAVENOUS
Status: DISCONTINUED | OUTPATIENT
Start: 2024-10-22 | End: 2024-10-22 | Stop reason: SDUPTHER

## 2024-10-22 RX ORDER — PROPOFOL 10 MG/ML
INJECTION, EMULSION INTRAVENOUS
Status: DISCONTINUED | OUTPATIENT
Start: 2024-10-22 | End: 2024-10-22 | Stop reason: SDUPTHER

## 2024-10-22 RX ORDER — SODIUM CHLORIDE 9 MG/ML
INJECTION, SOLUTION INTRAVENOUS PRN
Status: DISCONTINUED | OUTPATIENT
Start: 2024-10-22 | End: 2024-10-22 | Stop reason: HOSPADM

## 2024-10-22 RX ORDER — DEXAMETHASONE SODIUM PHOSPHATE 10 MG/ML
INJECTION, SOLUTION INTRAMUSCULAR; INTRAVENOUS
Status: DISCONTINUED | OUTPATIENT
Start: 2024-10-22 | End: 2024-10-22 | Stop reason: SDUPTHER

## 2024-10-22 RX ORDER — ROPIVACAINE HYDROCHLORIDE 5 MG/ML
INJECTION, SOLUTION EPIDURAL; INFILTRATION; PERINEURAL
Status: COMPLETED | OUTPATIENT
Start: 2024-10-22 | End: 2024-10-22

## 2024-10-22 RX ORDER — SODIUM CHLORIDE 0.9 % (FLUSH) 0.9 %
5-40 SYRINGE (ML) INJECTION PRN
Status: DISCONTINUED | OUTPATIENT
Start: 2024-10-22 | End: 2024-10-22 | Stop reason: HOSPADM

## 2024-10-22 RX ORDER — HYDROCODONE BITARTRATE AND ACETAMINOPHEN 5; 325 MG/1; MG/1
1 TABLET ORAL EVERY 6 HOURS PRN
Qty: 28 TABLET | Refills: 0 | Status: SHIPPED | OUTPATIENT
Start: 2024-10-22 | End: 2024-10-29

## 2024-10-22 RX ORDER — SODIUM CHLORIDE, SODIUM LACTATE, POTASSIUM CHLORIDE, CALCIUM CHLORIDE 600; 310; 30; 20 MG/100ML; MG/100ML; MG/100ML; MG/100ML
INJECTION, SOLUTION INTRAVENOUS CONTINUOUS
Status: DISCONTINUED | OUTPATIENT
Start: 2024-10-22 | End: 2024-10-22 | Stop reason: HOSPADM

## 2024-10-22 RX ORDER — MIDAZOLAM HYDROCHLORIDE 1 MG/ML
2 INJECTION, SOLUTION INTRAMUSCULAR; INTRAVENOUS ONCE
Status: COMPLETED | OUTPATIENT
Start: 2024-10-22 | End: 2024-10-22

## 2024-10-22 RX ADMIN — FENTANYL CITRATE 50 MCG: 50 INJECTION INTRAMUSCULAR; INTRAVENOUS at 15:57

## 2024-10-22 RX ADMIN — SODIUM CHLORIDE, POTASSIUM CHLORIDE, SODIUM LACTATE AND CALCIUM CHLORIDE: 600; 310; 30; 20 INJECTION, SOLUTION INTRAVENOUS at 13:09

## 2024-10-22 RX ADMIN — ROPIVACAINE HYDROCHLORIDE 25 ML: 5 INJECTION, SOLUTION EPIDURAL; INFILTRATION; PERINEURAL at 02:20

## 2024-10-22 RX ADMIN — Medication 2000 MG: at 16:02

## 2024-10-22 RX ADMIN — ONDANSETRON 4 MG: 2 INJECTION, SOLUTION INTRAMUSCULAR; INTRAVENOUS at 17:01

## 2024-10-22 RX ADMIN — FENTANYL CITRATE 100 MCG: 50 INJECTION INTRAMUSCULAR; INTRAVENOUS at 14:21

## 2024-10-22 RX ADMIN — LIDOCAINE HYDROCHLORIDE 80 MG: 20 INJECTION, SOLUTION EPIDURAL; INFILTRATION; INTRACAUDAL; PERINEURAL at 15:57

## 2024-10-22 RX ADMIN — METOCLOPRAMIDE 10 MG: 5 INJECTION, SOLUTION INTRAMUSCULAR; INTRAVENOUS at 18:30

## 2024-10-22 RX ADMIN — PROCHLORPERAZINE EDISYLATE 10 MG: 5 INJECTION INTRAMUSCULAR; INTRAVENOUS at 18:16

## 2024-10-22 RX ADMIN — ROCURONIUM BROMIDE 50 MG: 10 INJECTION, SOLUTION INTRAVENOUS at 15:57

## 2024-10-22 RX ADMIN — SUGAMMADEX 236 MG: 100 INJECTION, SOLUTION INTRAVENOUS at 17:01

## 2024-10-22 RX ADMIN — MIDAZOLAM 2 MG: 1 INJECTION INTRAMUSCULAR; INTRAVENOUS at 14:22

## 2024-10-22 RX ADMIN — PROPOFOL 200 MG: 10 INJECTION, EMULSION INTRAVENOUS at 15:57

## 2024-10-22 RX ADMIN — DEXAMETHASONE SODIUM PHOSPHATE 10 MG: 10 INJECTION, SOLUTION INTRAMUSCULAR; INTRAVENOUS at 16:01

## 2024-10-22 ASSESSMENT — PAIN - FUNCTIONAL ASSESSMENT
PAIN_FUNCTIONAL_ASSESSMENT: FACE, LEGS, ACTIVITY, CRY, AND CONSOLABILITY (FLACC)
PAIN_FUNCTIONAL_ASSESSMENT: 0-10

## 2024-10-22 NOTE — H&P
tendon repair.  I informed the patient of this recommendation and she is agreeable to surgery.  All details of the surgery were discussed today in office as well as the nonoperative versus operative treatments and associated risks with the operation and general anesthesia.  They verbalized understanding and filled out all of the surgery consent forms today.     The patient was informed that Yanelis Knutson will be contacting the patient next Monday.     Follow up:Return for 2-week post-op for right distal biceps tendon repair..    No orders of the defined types were placed in this encounter.    No orders of the defined types were placed in this encounter.    This note is created with the assistance of a speech recognition program.  While intending to generate a document that actually reflects the content of the visit, the document can still have some errors including those of syntax and sound a like substitutions which may escape proof reading.  In such instances, actual meaning can be extrapolated by contextual diversion.     Electronically signed by Neena David PA-C on 10/18/2024 at 2:38 PM

## 2024-10-22 NOTE — ANESTHESIA PROCEDURE NOTES
Peripheral Block    Patient location during procedure: pre-op  Reason for block: post-op pain management and at surgeon's request  Start time: 10/22/2024 2:20 AM  End time: 10/22/2024 2:48 AM  Staffing  Performed: anesthesiologist and other anesthesia staff   Anesthesiologist: Acosta Escobar MD  Other anesthesia staff: Jessica Desai RN  Performed by: Acosta Escobar MD  Authorized by: Acosta Escobar MD    Preanesthetic Checklist  Completed: patient identified, IV checked, site marked, risks and benefits discussed, surgical/procedural consents, equipment checked, pre-op evaluation, timeout performed, anesthesia consent given, oxygen available, monitors applied/VS acknowledged, fire risk safety assessment completed and verbalized and blood product R/B/A discussed and consented  Peripheral Block   Patient position: supine  Prep: ChloraPrep  Provider prep: mask and sterile gloves  Patient monitoring: cardiac monitor, continuous pulse ox, IV access and oxygen  Block type: Brachial plexus  Interscalene and Supraclavicular  Laterality: right  Injection technique: single-shot  Guidance: ultrasound guided    Needle   Needle type: insulated echogenic nerve stimulator needle   Needle localization: ultrasound guidance  Assessment   Injection assessment: negative aspiration for heme, no paresthesia on injection, local visualized surrounding nerve on ultrasound and no intravascular symptoms  Paresthesia pain: none  Slow fractionated injection: yes  Hemodynamics: stable  Outcomes: uncomplicated and patient tolerated procedure well    Additional Notes  20cc of 0.5% PF Ropivicaine injected for left brachial plexus block at supraclavicular level    5cc of 0.5% PF Ropivicaine injected for left brachial plexus block at interscalene level  Medications Administered  ropivacaine (NAROPIN) injection 0.5% - Perineural   25 mL - 10/22/2024 2:20:00 AM
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
## 2024-10-22 NOTE — DISCHARGE INSTRUCTIONS
Orthopaedic Instructions:  -Weight bearing status: Non weight bearing with the right arm  -Do not remove dressings or splint until your post-operative follow up date. It is important that you do not get your splint wet. To avoid this and still maintain proper hygiene, you can wrap a garbage/plastic bag (or similar waterproof material) about the splinted arm and secure it with tape while showering. One should still attempt to keep splint out of water with this method. If your splint were to fall off, it is important that you do not attempt to put it back on. Instead, return to the orthopaedic clinic for reapplication (see number below to call).  -Always look for signs of compartment syndrome: pain out of proportion to the injury, pain not controlled with pain medication, numbness in digits, changing of color of digits (paleness). If these signs occur return to ED immediately for reassessment.  -Always work on finger motion (to non-injured fingers) while in splint to decrease swelling.  -Ice (20 minutes on and off 1 hour) and elevate above the level of the heart to reduce swelling and throbbing pain.  -Should urinate within 8 hours of surgery.  -Call the office or come to Emergency Room if signs of infection appear (hot, swollen, red, draining pus, fever).  -Take medications as prescribed.  -Wean off narcotics (percocet/norco) as soon as possible. Do not take tylenol if still taking narcotics.  -Follow up with Dr. Cabrales in his office 11/4/24 at  {Blank multiple:73221::\"surgery\",\"injury\",\"***\"}. Call *** to schedule/confirm or with any questions/concerns.

## 2024-10-22 NOTE — ANESTHESIA POSTPROCEDURE EVALUATION
Department of Anesthesiology  Postprocedure Note    Patient: Janae Brooks  MRN: [id number]  YOB: 1990  Date of evaluation: 10/22/2024    Procedure Summary       Date: 10/22/24 Room / Location: [address]    Anesthesia Start: 1550 Anesthesia Stop: 1725    Procedure: RIGHT ELBOW DISTAL BICEPS TENDON REPAIR (Right: Elbow) Diagnosis:       Rupture of proximal biceps tendon, right, subsequent encounter      Right elbow pain      (Rupture of proximal biceps tendon, right, subsequent encounter [S46.211D])      (Right elbow pain [M25.521])    Surgeons: Zachery Cabrales DO Responsible Provider: Acosta Escobar MD    Anesthesia Type: general ASA Status: 3            Anesthesia Type: No value filed.    Nikolas Phase I: Nikolas Score: 9    Nikolas Phase II:      Anesthesia Post Evaluation    Patient location during evaluation: PACU  Patient participation: complete - patient participated  Level of consciousness: awake and alert  Airway patency: patent  Nausea & Vomiting: no nausea and no vomiting  Cardiovascular status: hemodynamically stable  Respiratory status: acceptable  Hydration status: euvolemic  Pain management: adequate    No notable events documented.

## 2024-10-22 NOTE — ANESTHESIA PRE PROCEDURE
Department of Anesthesiology  Preprocedure Note       Name:  Janae Brooks   Age:  33 y.o.  :  1990                                          MRN:  [id number]         Date:  10/22/2024      Surgeon: Surgeon(s):  Zachery Cabrales DO    Procedure: Procedure(s):  RIGHT ELBOW DISTAL BICEPS TENDON REPAIR    Medications prior to admission:   Prior to Admission medications    Medication Sig Start Date End Date Taking? Authorizing Provider   escitalopram (LEXAPRO) 10 MG tablet Take 1 tablet by mouth every morning 10/8/24  Yes ProviderAlta MD   cetirizine (ZYRTEC) 10 MG tablet Take 1 tablet by mouth daily   Yes ProviderAlta MD       Current medications:    Current Facility-Administered Medications   Medication Dose Route Frequency Provider Last Rate Last Admin   • ceFAZolin (ANCEF) 2000 mg in sterile water 20 mL IV syringe  2,000 mg IntraVENous Once Zachery Cabrales DO       • lactated ringers IV soln infusion SOLN   IntraVENous Continuous Acosta Escobar  mL/hr at 10/22/24 1309 New Bag at 10/22/24 1309   • lidocaine 1% (buffered) injection 0.5 mL  0.5 mL SubCUTAneous Once Acosta Escobar MD       • midazolam (VERSED) injection 2 mg  2 mg IntraVENous Once Acosta Escobar MD       • fentaNYL (SUBLIMAZE) injection 100 mcg  100 mcg IntraVENous Once Acosta Escobar MD           Allergies:    Allergies   Allergen Reactions   • Nickel Swelling and Rash       Problem List:    Patient Active Problem List   Diagnosis Code   • SAB (spontaneous ) X1 2019 O03.9   • family hx of polydactyly R89.9   • Family history of diabetes mellitus Z83.3   • History of anxiety/ depression Z86.59   • Hx of herpes simplex type 2  Z86.19   • Abnormal glucose tolerance test (GTT) R73.09   • Rupture of proximal biceps tendon, right, subsequent encounter S46.211D   • Right elbow pain M25.521       Past Medical History:        Diagnosis Date   • Anxiety    •

## 2024-10-22 NOTE — BRIEF OP NOTE
Brief Postoperative Note      Patient: Janae Brooks  YOB: 1990  MRN: [id number]    Date of Procedure: 10/22/2024    Pre-Op Diagnosis Codes:      Right distal biceps tendon rupture    Post-Op Diagnosis: Right distal biceps tendon rupture       Procedure(s):  RIGHT ELBOW DISTAL BICEPS TENDON REPAIR    Surgeon(s):  Zachery Cabrales DO    Assistant:  Resident: Leonard Kerr DO; David Christianson DO    Anesthesia: General    Estimated Blood Loss (mL): 75    Complications: None    Specimens:   * No specimens in log *    Implants:  Implant Name Type Inv. Item Serial No.  Lot No. LRB No. Used Action   SYSTEM IMPL DST BICEPS REP DEL W/ BICEPS BTTN 7X10MM PEEK - [id number]  SYSTEM IMPL DST BICEPS REP DEL W/ BICEPS BTTN 7X10MM PEEK  [employer] [id number] Right 1 Implanted         Drains: * No LDAs found *    Findings:  Infection Present At Time Of Surgery (PATOS) (choose all levels that have infection present):  No infection present  Other Findings: Right distal biceps tendon rupture    Electronically signed by Zachery Cabrales DO on 10/22/2024 at 5:22 PM

## 2024-11-04 ENCOUNTER — OFFICE VISIT (OUTPATIENT)
Dept: ORTHOPEDIC SURGERY | Age: 34
End: 2024-11-04

## 2024-11-04 VITALS — BODY MASS INDEX: 40.81 KG/M2 | WEIGHT: 260 LBS | HEIGHT: 67 IN

## 2024-11-04 DIAGNOSIS — S46.211D BICEPS RUPTURE, DISTAL, RIGHT, SUBSEQUENT ENCOUNTER: ICD-10-CM

## 2024-11-04 DIAGNOSIS — Z09 S/P ORTHOPEDIC SURGERY, FOLLOW-UP EXAM: Primary | ICD-10-CM

## 2024-11-04 PROCEDURE — 99024 POSTOP FOLLOW-UP VISIT: CPT | Performed by: PHYSICIAN ASSISTANT

## 2024-11-04 ASSESSMENT — ENCOUNTER SYMPTOMS
COLOR CHANGE: 0
VOMITING: 0
COUGH: 0
SHORTNESS OF BREATH: 0

## 2024-11-04 NOTE — PROGRESS NOTES
complication.     FLUORO FOR SURGICAL PROCEDURES    Result Date: 10/22/2024  Radiology exam is complete. No Radiologist dictation. Please follow up with ordering provider.        Procedure: None.    Assessment:      1. S/P orthopedic surgery, follow-up exam    2. Biceps rupture, distal, right, subsequent encounter         Plan:   Assessment & Plan     Janae Brooks is a 33 y.o. female who is 2 weeks post-op after undergoing a right elbow distal biceps tendon repair on 10/22/2024 with Dr Zachery Cabrales DO.    The patient is doing very well and her incision is healing without complication. We discussed she can remain of the sling and work on gentle range of motion of the right elbow in flexion, extension, pronation and supination while at home. She should not lift anything heavier than a coffee cup over the next 4 weeks.     The patient will follow up in 4 weeks with Dr Zachery Cabrales DO. At that time a referral to formal PT will likely be placed.      The patient was instructed to call our office with any questions or concerns prior to the next appointment.  The patient noted her understanding.          Follow up: Return in about 4 weeks (around 12/2/2024) for post-operative follow up.    No orders of the defined types were placed in this encounter.      No orders of the defined types were placed in this encounter.      This note is created with the assistance of a speech recognition program.  While intending to generate a document that actually reflects the content of the visit, the document can still have some errors including those of syntax and sound a like substitutions which may escape proof reading.  In such instances, actual meaning can be extrapolated by contextual diversion.     Electronically signed by Vianca Barillas PA-C on 11/4/2024 at 10:03 AM

## 2024-12-05 ENCOUNTER — OFFICE VISIT (OUTPATIENT)
Dept: ORTHOPEDIC SURGERY | Age: 34
End: 2024-12-05

## 2024-12-05 VITALS — HEIGHT: 66 IN | WEIGHT: 264 LBS | RESPIRATION RATE: 14 BRPM | BODY MASS INDEX: 42.43 KG/M2

## 2024-12-05 DIAGNOSIS — S46.211D BICEPS RUPTURE, DISTAL, RIGHT, SUBSEQUENT ENCOUNTER: ICD-10-CM

## 2024-12-05 DIAGNOSIS — Z09 S/P ORTHOPEDIC SURGERY, FOLLOW-UP EXAM: Primary | ICD-10-CM

## 2024-12-05 PROCEDURE — 99024 POSTOP FOLLOW-UP VISIT: CPT | Performed by: ORTHOPAEDIC SURGERY

## 2024-12-05 ASSESSMENT — ENCOUNTER SYMPTOMS
ABDOMINAL PAIN: 0
SHORTNESS OF BREATH: 0
EYE DISCHARGE: 0
ROS SKIN COMMENTS: NEGATIVE FOR RASH

## 2024-12-05 NOTE — PROGRESS NOTES
[facility] ORTHOPEDICS AND SPORTS MEDICINE  [address] [id number]  Dept: [phone]  Dept Fax: [phone]        Postoperative follow-up note    Subjective:     History of Present Illness  The patient is a 33-year-old female who presents for a follow-up visit.    She underwent a right distal biceps tendon repair on 10/22/2024. Her last office visit was with Vianca, our physician assistant, on 11/04/2024. At that time, Vianca removed the splint and noted that everything appeared to be healing well.    Previously, she reported some numbness and tingling in her fingertips, but this has now completely resolved. She is able to fully extend her arm and has regained full sensation. She reports no issues with her fingers and overall, she is doing well.    She has not yet started therapy but plans to begin today.  .    Chief Complaint   Patient presents with    Post-Op Check     Right elbow distal biceps repair. DOS 10/22/24       Review of Systems   Constitutional:  Positive for activity change. Negative for fever.   HENT:  Negative for dental problem.    Eyes:  Negative for discharge.   Respiratory:  Negative for shortness of breath.    Cardiovascular:  Negative for chest pain.   Gastrointestinal:  Negative for abdominal pain.   Genitourinary: Negative.    Musculoskeletal:  Positive for arthralgias.   Skin:         Negative for rash   Neurological:  Positive for weakness.   Psychiatric/Behavioral:  Negative for confusion.        I have reviewed the CC, HPI, ROS, PMH, FHX, Social History, and if not present in this note, I have reviewed in the patient's chart.   I agree with the documentation provided by other staff and have reviewed their documentation prior to providing my signature indicating agreement.    Objective :   General: Janae Brooks is a 33 y.o. female who is alert and oriented and sitting comfortably in our

## 2024-12-09 ENCOUNTER — TELEPHONE (OUTPATIENT)
Dept: ORTHOPEDIC SURGERY | Age: 34
End: 2024-12-09

## 2024-12-09 NOTE — TELEPHONE ENCOUNTER
Jodee from [facility] physical therapy is requesting pts referral be changed to OT for any further questions jodee can be reached at [phone]

## 2024-12-10 DIAGNOSIS — Z09 S/P ORTHOPEDIC SURGERY, FOLLOW-UP EXAM: ICD-10-CM

## 2024-12-10 DIAGNOSIS — S46.211D RUPTURE OF PROXIMAL BICEPS TENDON, RIGHT, SUBSEQUENT ENCOUNTER: Primary | ICD-10-CM

## 2024-12-10 DIAGNOSIS — S46.211D BICEPS RUPTURE, DISTAL, RIGHT, SUBSEQUENT ENCOUNTER: ICD-10-CM

## 2024-12-10 NOTE — TELEPHONE ENCOUNTER
Order placed and changed to Occupational therapy. Patient can be contacted for therapy. Called and Lvm for lynnette to let her know that order was changed.

## 2024-12-17 ENCOUNTER — HOSPITAL ENCOUNTER (OUTPATIENT)
Dept: OCCUPATIONAL THERAPY | Age: 34
Setting detail: THERAPIES SERIES
Discharge: HOME | End: 2024-12-17
Attending: ORTHOPAEDIC SURGERY
Payer: MEDICAID

## 2024-12-17 PROCEDURE — 97110 THERAPEUTIC EXERCISES: CPT

## 2024-12-17 PROCEDURE — 97165 OT EVAL LOW COMPLEX 30 MIN: CPT
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
## 2024-12-18 ENCOUNTER — HOSPITAL ENCOUNTER (OUTPATIENT)
Dept: OCCUPATIONAL THERAPY | Age: 34
Setting detail: THERAPIES SERIES
Discharge: HOME | End: 2024-12-18
Attending: ORTHOPAEDIC SURGERY
Payer: MEDICAID

## 2024-12-18 PROCEDURE — 97110 THERAPEUTIC EXERCISES: CPT

## 2024-12-18 NOTE — FLOWSHEET NOTE
completed     Wrist roll   3 series   1/2 pound Added & completed      Resistive pinch pin to transfer foam blocks   1 series   Red  Added & completed    Pronation/supination pouring  2 series   Added & completed    Hand exerciser  20 reps x2 sets  10 pounds Added & completed    Weighted pronation/supination 15 reps  1/2 pound Added & completed    Flexbar   strength  Wrist Flexion/Ext  Wrist pronation  Wrist supination  Wrist ulnar deviation  Wrist radial dev  Shoulder Adb  Shoulder Add 15 reps each Red Added & completed    Other: NA   Access Code: [email or web address]  URL: [email or web address]  Date: 12/17/2024  Prepared by: Gracia Shah     Exercises  - Wrist Flexion Extension AROM with Fingers Curled and Palm Down  - 1 x daily - 7 x weekly - 3 sets - 10 reps  - Seated Forearm Pronation and Supination AROM  - 1 x daily - 7 x weekly - 3 sets - 10 reps  - Forearm Pronation and Supination with Hammer  - 1 x daily - 7 x weekly - 3 sets - 10 reps  - Medial Elbow Scar Massage  - 1 x daily - 7 x weekly - 3 sets - 10 reps      Assessment: [x] Progressing toward goals. Initiated with scar massage, scar feels very mobile and soft. Minimal adhesion. Initiated strength program this date. Minimal increase in discomfort with strength exercises, nothing intolerable. Visibly improved pronation and supination compared to yesterday. Less discomfort with passive stretching. Pt compliant with HEP.     [] No change.  [] Other     [x] Patient would continue to benefit from skilled occupational therapy services in order to: Improve  ROM, Strength, and Activity tolerance in order to improve functional use of UE in ADL performance     Short Term Goals: (  8    Treatments)  Increase AROM  R forearm supination by 20 degrees for improved functional use  R forearm pronation by 20 degrees for improved functional use  Increase strength (pounds);  R  by 8 pounds for less difficulty with functional tasks  All R pinches by 3 pounds for

## 2024-12-27 ENCOUNTER — HOSPITAL ENCOUNTER (OUTPATIENT)
Dept: OCCUPATIONAL THERAPY | Age: 34
Setting detail: THERAPIES SERIES
Discharge: HOME | End: 2024-12-27
Attending: ORTHOPAEDIC SURGERY
Payer: MEDICAID

## 2024-12-27 PROCEDURE — 97110 THERAPEUTIC EXERCISES: CPT

## 2024-12-27 NOTE — FLOWSHEET NOTE
[x] [facility]  Outpatient Rehabilitation &  Therapy  [address]  [phone]  F: [phone] [] [facility]  Outpatient Rehabilitation &  Therapy  [address]   [address]  P: [phone]  F: [phone] [] [facility]  Outpatient Rehabilitation &  Therapy  [address]  P: [phone]  F: [phone] [] [facility]  Outpatient Rehabilitation &  Therapy  [address]   P: [phone]  F: [phone] [] [facility]   Outpatient Rehabilitation   & Therapy  [address]  P: [phone]   F: [phone]     Occupational Therapy Daily Treatment Note    Date:  2024  Patient Name:  Janae Brooks    :  1990  MRN: [id number]  Referring Provider:  Zachery Cabrales DO  Insurance: Other - [facility] Medicaid - 30 visits   Medical Diagnosis: rupture of proximal biceps tendon, right S46.211, biceps rupture, distal, right S46.211, s/p orthopedic surgery Z09       Rehab Codes: muscle weakness generalized M62.81, or pain in right forearm M79.631,  Onset Date: 2024                        Next Dr. Appt: 25  Visit# / total visits: 3/; Progress note for patient due at visit 8    Cancels/No Shows: 0/0    Subjective:    Pain:  [] Yes  [x] No Location: R elbow  Pain Rating: (0-10 scale) 0/10  Pain altered Tx:  [x] No  [] Yes  Action: NA  Pt Comments: No complaints upon entry.     Precautions [] No  [x] Yes: 10lb lifting restriction   Surgery procedure and date: 10/22/24 distal bicep tendon repair     Objective:  Today's Treatment:  Modalities: NA this date  Exercise Flow Sheet:  Exercise Reps/Time Weight/Level Comments   Scar massage  5 minutes    Not Completed - HEP   PROM - sup/pro, wrist ext/flex 5 minutes    Not Completed - HEP    AROM   15 reps each      Not Completed     Pronation/supination cones   10 reps   x2 sets    Completed     Wrist roll   3 series   2 pounds

## 2024-12-30 ENCOUNTER — HOSPITAL ENCOUNTER (OUTPATIENT)
Dept: OCCUPATIONAL THERAPY | Age: 34
Setting detail: THERAPIES SERIES
Discharge: HOME | End: 2024-12-30
Attending: ORTHOPAEDIC SURGERY
Payer: MEDICAID

## 2024-12-30 NOTE — FLOWSHEET NOTE
[x] [facility]       Occupational Therapy       [address]       Phone: [phone]       Fax: [phone] [] [facility] Occupational  Therapy at [place]       [address]       Phone: [phone]       Fax: [phone] [] [facility]  Outpatient Rehabilitation &  Therapy  [address]   [address]  P: [phone]  F: [phone]           Occupational Therapy Cancel/No Show note    Date: 2024  Patient: Janae Brooks  : 1990  MRN: [id number]  Cancels/No Shows to date:     For today's appointment patient:  [x]  Cancelled  []  Rescheduled appointment  []  No-show     Reason given by patient:  []  Patient ill  []  Conflicting appointment  []  No transportation    []  Conflict with work  []  No reason given  [x]  Other:  pt arm is hurting    Comments:      Electronically signed by: HARVINDER Norman/L       0 = swallows foods/liquids without difficulty

## 2025-01-02 ENCOUNTER — HOSPITAL ENCOUNTER (OUTPATIENT)
Dept: OCCUPATIONAL THERAPY | Age: 35
Setting detail: THERAPIES SERIES
Discharge: HOME | End: 2025-01-02
Attending: ORTHOPAEDIC SURGERY

## 2025-01-02 NOTE — FLOWSHEET NOTE
[x] [facility]       Occupational Therapy       [address]       Phone: [phone]       Fax: [phone] [] [facility] Occupational  Therapy at [place]       [address]       Phone: [phone]       Fax: [phone] [] [facility]  Outpatient Rehabilitation &  Therapy  [address]   [address]  P: [phone]  F: [phone]           Occupational Therapy Cancel/No Show note    Date: 2025  Patient: Janae Brooks  : 1990  MRN: [id number]  Cancels/No Shows to date:     For today's appointment patient:  []  Cancelled  []  Rescheduled appointment  [x]  No-show     Reason given by patient:  []  Patient ill  []  Conflicting appointment  []  No transportation    []  Conflict with work  []  No reason given  [x]  Other:  appt was previously confirmed   Comments:      Electronically signed by: HARVINDER Norman/L

## 2025-01-13 ENCOUNTER — OFFICE VISIT (OUTPATIENT)
Dept: ORTHOPEDIC SURGERY | Age: 35
End: 2025-01-13

## 2025-01-13 VITALS — HEIGHT: 66 IN | WEIGHT: 265 LBS | BODY MASS INDEX: 42.59 KG/M2

## 2025-01-13 DIAGNOSIS — Z09 S/P ORTHOPEDIC SURGERY, FOLLOW-UP EXAM: Primary | ICD-10-CM

## 2025-01-13 DIAGNOSIS — S46.211D BICEPS RUPTURE, DISTAL, RIGHT, SUBSEQUENT ENCOUNTER: ICD-10-CM

## 2025-01-13 PROCEDURE — 99024 POSTOP FOLLOW-UP VISIT: CPT

## 2025-01-13 ASSESSMENT — ENCOUNTER SYMPTOMS: COLOR CHANGE: 0

## 2025-01-13 NOTE — PROGRESS NOTES
progress in her recovery, with near full range of motion observed. However, she reports some tightness and occasional pain when lifting objects. She has been advised to exercise caution with lifting activities due to the potential risk of retear. A weight limit of 20 pounds has been recommended until she reaches the 4-month post-operative sin. At that point, she may gradually increase her weight-bearing activities as tolerated. She is encouraged to continue her range of motion exercises independently at home. She has been instructed to contact us should any complications arise.    PROCEDURE  The patient underwent a right elbow distal biceps tendon repair procedure performed by Dr. Cabrales on 10/22/2024.     The patient (or guardian, if applicable) and other individuals in attendance with the patient were advised that Artificial Intelligence will be utilized during this visit to record, process the conversation to generate a clinical note, and support improvement of the AI technology. The patient (or guardian, if applicable) and other individuals in attendance at the appointment consented to the use of AI, including the recording.         Follow up: Return if symptoms worsen or fail to improve, for right distal biceps tendon repair..    No orders of the defined types were placed in this encounter.      No orders of the defined types were placed in this encounter.      This note is created with the assistance of a speech recognition program.  While intending to generate a document that actually reflects the content of the visit, the document can still have some errors including those of syntax and sound a like substitutions which may escape proof reading.  In such instances, actual meaning can be extrapolated by contextual diversion.     Electronically signed by Neena David PA-C on 1/13/2025 at 1:21 PM

## 2025-05-06 ENCOUNTER — HOSPITAL ENCOUNTER (EMERGENCY)
Age: 35
Discharge: HOME | End: 2025-05-06
Attending: EMERGENCY MEDICINE
Payer: MEDICAID

## 2025-05-06 ENCOUNTER — TELEPHONE (OUTPATIENT)
Dept: FAMILY MEDICINE CLINIC | Age: 35
End: 2025-05-06

## 2025-05-06 ENCOUNTER — APPOINTMENT (OUTPATIENT)
Dept: ULTRASOUND IMAGING | Age: 35
End: 2025-05-06
Payer: MEDICAID

## 2025-05-06 VITALS
RESPIRATION RATE: 20 BRPM | SYSTOLIC BLOOD PRESSURE: 136 MMHG | HEART RATE: 100 BPM | DIASTOLIC BLOOD PRESSURE: 76 MMHG | OXYGEN SATURATION: 99 % | TEMPERATURE: 98.4 F

## 2025-05-06 DIAGNOSIS — R10.11 ABDOMINAL PAIN, RIGHT UPPER QUADRANT: Primary | ICD-10-CM

## 2025-05-06 LAB
ALBUMIN SERPL-MCNC: 3.8 G/DL (ref 3.5–5.2)
ALBUMIN/GLOB SERPL: 1.1 {RATIO} (ref 1–2.5)
ALP SERPL-CCNC: 82 U/L (ref 35–104)
ALT SERPL-CCNC: 16 U/L (ref 10–35)
ANION GAP SERPL CALCULATED.3IONS-SCNC: 10 MMOL/L (ref 9–16)
AST SERPL-CCNC: 19 U/L (ref 10–35)
BASOPHILS # BLD: 0.04 K/UL (ref 0–0.2)
BASOPHILS NFR BLD: 1 % (ref 0–2)
BILIRUB SERPL-MCNC: 0.2 MG/DL (ref 0–1.2)
BUN SERPL-MCNC: 10 MG/DL (ref 6–20)
CALCIUM SERPL-MCNC: 8.9 MG/DL (ref 8.6–10.4)
CHLORIDE SERPL-SCNC: 105 MMOL/L (ref 98–107)
CO2 SERPL-SCNC: 23 MMOL/L (ref 20–31)
CREAT SERPL-MCNC: 1 MG/DL (ref 0.6–0.9)
EOSINOPHIL # BLD: 0.14 K/UL (ref 0–0.44)
EOSINOPHILS RELATIVE PERCENT: 2 % (ref 1–4)
ERYTHROCYTE [DISTWIDTH] IN BLOOD BY AUTOMATED COUNT: 13.5 % (ref 11.8–14.4)
GFR, ESTIMATED: 77 ML/MIN/1.73M2
GLUCOSE SERPL-MCNC: 99 MG/DL (ref 74–99)
HCG SERPL QL: NEGATIVE
HCT VFR BLD AUTO: 40.1 % (ref 36.3–47.1)
HGB BLD-MCNC: 13 G/DL (ref 11.9–15.1)
IMM GRANULOCYTES # BLD AUTO: <0.03 K/UL (ref 0–0.3)
IMM GRANULOCYTES NFR BLD: 0 %
LIPASE SERPL-CCNC: 34 U/L (ref 13–60)
LYMPHOCYTES NFR BLD: 2.43 K/UL (ref 1.1–3.7)
LYMPHOCYTES RELATIVE PERCENT: 30 % (ref 24–43)
MCH RBC QN AUTO: 27.9 PG (ref 25.2–33.5)
MCHC RBC AUTO-ENTMCNC: 32.4 G/DL (ref 28.4–34.8)
MCV RBC AUTO: 86.1 FL (ref 82.6–102.9)
MONOCYTES NFR BLD: 0.59 K/UL (ref 0.1–1.2)
MONOCYTES NFR BLD: 7 % (ref 3–12)
NEUTROPHILS NFR BLD: 60 % (ref 36–65)
NEUTS SEG NFR BLD: 4.78 K/UL (ref 1.5–8.1)
NRBC BLD-RTO: 0 PER 100 WBC
PLATELET # BLD AUTO: 282 K/UL (ref 138–453)
PMV BLD AUTO: 9.5 FL (ref 8.1–13.5)
POTASSIUM SERPL-SCNC: 4 MMOL/L (ref 3.7–5.3)
PROT SERPL-MCNC: 7.2 G/DL (ref 6.6–8.7)
RBC # BLD AUTO: 4.66 M/UL (ref 3.95–5.11)
SODIUM SERPL-SCNC: 138 MMOL/L (ref 136–145)
WBC OTHER # BLD: 8 K/UL (ref 3.5–11.3)

## 2025-05-06 PROCEDURE — 96374 THER/PROPH/DIAG INJ IV PUSH: CPT

## 2025-05-06 PROCEDURE — 6360000002 HC RX W HCPCS

## 2025-05-06 PROCEDURE — 83690 ASSAY OF LIPASE: CPT

## 2025-05-06 PROCEDURE — 80053 COMPREHEN METABOLIC PANEL: CPT

## 2025-05-06 PROCEDURE — 6370000000 HC RX 637 (ALT 250 FOR IP)

## 2025-05-06 PROCEDURE — 99284 EMERGENCY DEPT VISIT MOD MDM: CPT

## 2025-05-06 PROCEDURE — 84703 CHORIONIC GONADOTROPIN ASSAY: CPT

## 2025-05-06 PROCEDURE — 85025 COMPLETE CBC W/AUTO DIFF WBC: CPT

## 2025-05-06 PROCEDURE — 76705 ECHO EXAM OF ABDOMEN: CPT

## 2025-05-06 RX ORDER — FAMOTIDINE 20 MG/1
20 TABLET, FILM COATED ORAL 2 TIMES DAILY
Qty: 60 TABLET | Refills: 0 | Status: SHIPPED | OUTPATIENT
Start: 2025-05-06

## 2025-05-06 RX ORDER — ONDANSETRON 2 MG/ML
4 INJECTION INTRAMUSCULAR; INTRAVENOUS ONCE
Status: COMPLETED | OUTPATIENT
Start: 2025-05-06 | End: 2025-05-06

## 2025-05-06 RX ORDER — KETOROLAC TROMETHAMINE 30 MG/ML
30 INJECTION, SOLUTION INTRAMUSCULAR; INTRAVENOUS ONCE
Status: DISCONTINUED | OUTPATIENT
Start: 2025-05-06 | End: 2025-05-06 | Stop reason: HOSPADM

## 2025-05-06 RX ORDER — ACETAMINOPHEN 500 MG
1000 TABLET ORAL ONCE
Status: COMPLETED | OUTPATIENT
Start: 2025-05-06 | End: 2025-05-06

## 2025-05-06 RX ADMIN — ACETAMINOPHEN 1000 MG: 500 TABLET ORAL at 18:36

## 2025-05-06 RX ADMIN — ONDANSETRON 4 MG: 2 INJECTION, SOLUTION INTRAMUSCULAR; INTRAVENOUS at 18:36

## 2025-05-06 ASSESSMENT — ENCOUNTER SYMPTOMS
DIARRHEA: 0
VOMITING: 0
BLOOD IN STOOL: 0
NAUSEA: 1
ABDOMINAL PAIN: 1
SHORTNESS OF BREATH: 0

## 2025-05-06 NOTE — ED PROVIDER NOTES
[facility] EMERGENCY DEPARTMENT  Emergency Department Encounter  Emergency Medicine Resident     Pt Name:Janae Brooks  MRN: [id number]  Birthdate 1990  Date of evaluation: 5/6/25  PCP:  No primary care provider on file.  Note Started: 6:25 PM EDT      CHIEF COMPLAINT       Chief Complaint   Patient presents with    Abdominal Pain     RUQ abd pain       HISTORY OF PRESENT ILLNESS  (Location/Symptom, Timing/Onset, Context/Setting, Quality, Duration, Modifying Factors, Severity.)      Janae Brooks is a 34 y.o. female with history of cholecystectomy who presents with right-sided abdominal pain that started yesterday morning.  Patient initially thought it was gas but it has not gone away which is why she presented to the emergency department today.  Patient localizes the abdominal pain more to her right upper quadrant region.  She has felt nauseous but no vomiting.  No fevers.  She has been having normal bowel movements.  Denies any abnormal vaginal discharge or vaginal bleeding.  No urinary symptoms.  She is not on any birth control.  She is due to have her period next week.  Patient is concerned that this may be related to her gallbladder even though it was taken out.    PAST MEDICAL / SURGICAL / SOCIAL / FAMILY HISTORY      has a past medical history of Anxiety, Chronic GERD, Depression, Ovarian cyst, and PTSD (post-traumatic stress disorder).     has a past surgical history that includes Cholecystectomy and Biceps tendon repair (Right, 10/22/2024).    Social History     Socioeconomic History    Marital status: Single     Spouse name: Not on file    Number of children: Not on file    Years of education: Not on file    Highest education level: Not on file   Occupational History    Not on file   Tobacco Use    Smoking status: Every Day     Types: Cigarettes     Start date: 2001    Smokeless tobacco: Never   Vaping Use    Vaping status: Former   Substance and Sexual Activity    Alcohol use: Not

## 2025-05-06 NOTE — ED TRIAGE NOTES
Pt presents to ED room 22 ambulatory from triage c/o RUQ abd pain with nausea that started yesterday morning. Pt reports that every time she takes a step or bends down she feels sharp pain in her RUQ. Pt has Hx of cholecystectomy.

## 2025-05-06 NOTE — TELEPHONE ENCOUNTER
----- Message from Matt MAYER sent at 5/6/2025 11:57 AM EDT -----  Regarding: [facility] Appointment Request  [facility] Appointment Request    Patient needs appointment for [facility] Appointment Type: New Patient.    Patient Requested Dates(s):  any day   Patient Requested Time: around 1 pm   Provider Name: any provider that accept [employer]     Reason for Appointment Request: Other : try to check if the doctor will accept her insurance   --------------------------------------------------------------------------------------------------------------------------    Relationship to Patient: Self     Call Back Information: OK to leave message on voicemail  Preferred Call Back Number: Phone [phone]

## 2025-05-06 NOTE — ED PROVIDER NOTES
[facility]     Emergency Department     Faculty Attestation    I performed a history and physical examination of the patient and discussed management with the resident. I have reviewed and agree with the resident’s findings including all diagnostic interpretations, and treatment plans as written at the time of my review. Any areas of disagreement are noted on the chart. I was personally present for the key portions of any procedures. I have documented in the chart those procedures where I was not present during the key portions. For Physician Assistant/ Nurse Practitioner cases/documentation I have personally evaluated this patient and have completed at least one if not all key elements of the E/M (history, physical exam, and MDM). Additional findings are as noted.    PtName: Janae Brooks  MRN: [id number]  Birthdate 1990  Date of evaluation: 5/6/25  Note Started: 6:36 PM EDT    Primary Care Physician: No primary care provider on file.        History: This is a 34 y.o. female who presents to the Emergency Department with complaint of abdominal pain.    Physical:   temperature is 98.4 °F (36.9 °C). Her blood pressure is 136/76 and her pulse is 100. Her respiration is 20 and oxygen saturation is 99%.  Mild tenderness to palpation in the right upper quadrant and right mid abdominal area.  No guarding or rebound noted.    Impression: Abdominal pain    Plan: CBC, CMP, lipase, hCG      Medical Decision Making  Problems Addressed:  Abdominal pain, right upper quadrant: acute illness or injury    Amount and/or Complexity of Data Reviewed  Labs: ordered. Decision-making details documented in ED Course.  Radiology: ordered.    Risk  OTC drugs.  Prescription drug management.            (Please note that portions of this note were completed with a voice recognition program.  Efforts were made to edit the dictations but occasionally words are

## 2025-05-07 NOTE — DISCHARGE INSTRUCTIONS
You were seen in the emergency department for right upper quadrant abdominal pain.  Your vital signs were stable.  No fever noted.    Lab work was within normal limits.  Ultrasound of your liver and ducts did not show any abnormalities.  This could be biliary colic even though your gallbladder was removed.    We will start you on Pepcid.  Please take this twice daily for the next month.  Be sure to eat a diet that is low in fat.  Be sure to stay well-hydrated.  Please eat a high-fiber diet as well.    Please follow-up with your primary care provider.  I know you are in the process of getting 1 set up this week.    If you continue to have persistent pain like this, you may follow-up with your general surgeon who removed your gallbladder as they may be able to order further testing if necessary.    Please return to the emergency department if your pain gets any worse or if you develop any vomiting, fevers, or any other concerning symptoms.

## (undated) DEVICE — YANKAUER,FLEXIBLE HANDLE,REGLR CAPACITY: Brand: MEDLINE INDUSTRIES, INC.

## (undated) DEVICE — SPLINT QUICK STEP SCOTCHCAST 4 X 30

## (undated) DEVICE — STAZ UPPER EXTREMITY: Brand: MEDLINE INDUSTRIES, INC.

## (undated) DEVICE — BNDG,ELSTC,MATRIX,STRL,4"X5YD,LF,HOOK&LP: Brand: MEDLINE

## (undated) DEVICE — SUTURE FIBERLOOP SZ 2-0 L20IN NONABSORBABLE BLU STR NDL AR7234

## (undated) DEVICE — BANDAGE COBAN 4 IN COMPR W4INXL5YD FOAM COHESIVE QUIK STK SELF ADH SFT

## (undated) DEVICE — NEPTUNE E-SEP SMOKE EVACUATION PENCIL, COATED, 70MM BLADE, PUSH BUTTON SWITCH: Brand: NEPTUNE E-SEP

## (undated) DEVICE — SUTURE STRATAFIX SPRL SZ 3-0 L5IN ABSRB UD FS L26MM 3/8 CIR SXMP2B411

## (undated) DEVICE — 4-PORT MANIFOLD: Brand: NEPTUNE 2

## (undated) DEVICE — DRAPE,REIN 53X77,STERILE: Brand: MEDLINE

## (undated) DEVICE — TUBING SUCT 10FR MAL ALUM SHFT FN CAP VENT UNIV CONN W/ OBT

## (undated) DEVICE — DRAPE,U/ SHT,SPLIT,PLAS,STERIL: Brand: MEDLINE

## (undated) DEVICE — PADDING,UNDERCAST,COTTON, 3X4YD STERILE: Brand: MEDLINE

## (undated) DEVICE — SPONGE LAP W18XL18IN WHT COT 4 PLY FLD STRUNG RADPQ DISP ST 2 PER PACK

## (undated) DEVICE — LIQUIBAND RAPID ADHESIVE 36/CS 0.8ML: Brand: MEDLINE

## (undated) DEVICE — C-ARM: Brand: UNBRANDED

## (undated) DEVICE — PADDING,UNDERCAST,COTTON, 4"X4YD STERILE: Brand: MEDLINE

## (undated) DEVICE — GLOVE SURG SZ 85 CRM LTX FREE POLYISOPRENE POLYMER BEAD ANTI

## (undated) DEVICE — BNDG,ELSTC,MATRIX,STRL,3"X5YD,LF,HOOK&LP: Brand: MEDLINE

## (undated) DEVICE — GARMENT,MEDLINE,DVT,INT,CALF,LG, GEN2: Brand: MEDLINE

## (undated) DEVICE — SUTURE VICRYL SZ 2-0 L27IN ABSRB UD L26MM CT-2 1/2 CIR J269H

## (undated) DEVICE — GOWN,SIRUS,NONRNF,SETINSLV,XL,20/CS: Brand: MEDLINE

## (undated) DEVICE — Device

## (undated) DEVICE — SOLUTION IRRIG 1000ML 0.9% SOD CHL USP POUR PLAS BTL

## (undated) DEVICE — 3M™ IOBAN™ 2 ANTIMICROBIAL INCISE DRAPE 6650EZ: Brand: IOBAN™ 2

## (undated) DEVICE — NEPTUNE E-SEP 165MM SUCTION SLEEVE: Brand: NEPTUNE E-SEP

## (undated) DEVICE — INTENDED FOR TISSUE SEPARATION, AND OTHER PROCEDURES THAT REQUIRE A SHARP SURGICAL BLADE TO PUNCTURE OR CUT.: Brand: BARD-PARKER ® CARBON RIB-BACK BLADES

## (undated) DEVICE — PADDING CAST W2INXL4YD COT LO LINTING WYTEX

## (undated) DEVICE — SOLUTION IRRIG 500ML 0.9% SOD CHLO USP POUR PLAS BTL

## (undated) DEVICE — ELECTRODE PT RET AD L9FT HI MOIST COND ADH HYDRGEL CORDED

## (undated) DEVICE — BLADE ES L6IN ELASTOMERIC COAT EXT DURABLE BEND UPTO 90DEG

## (undated) DEVICE — CORD,CAUTERY,BIPOLAR,STERILE: Brand: MEDLINE